# Patient Record
Sex: FEMALE | Race: WHITE | Employment: OTHER | URBAN - NONMETROPOLITAN AREA
[De-identification: names, ages, dates, MRNs, and addresses within clinical notes are randomized per-mention and may not be internally consistent; named-entity substitution may affect disease eponyms.]

---

## 2017-01-18 ENCOUNTER — CARE COORDINATION (OUTPATIENT)
Dept: PRIMARY CARE CLINIC | Age: 22
End: 2017-01-18

## 2017-04-28 ENCOUNTER — HOSPITAL ENCOUNTER (EMERGENCY)
Facility: HOSPITAL | Age: 22
Discharge: HOME | End: 2017-04-29
Attending: FAMILY MEDICINE | Admitting: EMERGENCY MEDICINE

## 2017-04-28 DIAGNOSIS — T65.91XA ACCIDENTAL INGESTION OF SUBSTANCE, ACCIDENTAL OR UNINTENTIONAL, INITIAL ENCOUNTER: Primary | ICD-10-CM

## 2017-04-28 PROCEDURE — 99283 EMERGENCY DEPT VISIT LOW MDM: CPT

## 2017-04-29 VITALS
HEART RATE: 94 BPM | RESPIRATION RATE: 20 BRPM | DIASTOLIC BLOOD PRESSURE: 75 MMHG | SYSTOLIC BLOOD PRESSURE: 129 MMHG | OXYGEN SATURATION: 100 %

## 2017-11-14 ENCOUNTER — TRANSCRIBE ORDERS (OUTPATIENT)
Dept: GENERAL RADIOLOGY | Facility: HOSPITAL | Age: 22
End: 2017-11-14

## 2017-11-14 ENCOUNTER — LAB (OUTPATIENT)
Dept: LAB | Facility: HOSPITAL | Age: 22
End: 2017-11-14

## 2017-11-14 DIAGNOSIS — Z00.01 ENCOUNTER FOR GENERAL ADULT MEDICAL EXAMINATION WITH ABNORMAL FINDINGS: ICD-10-CM

## 2017-11-14 DIAGNOSIS — Z00.01 ENCOUNTER FOR GENERAL ADULT MEDICAL EXAMINATION WITH ABNORMAL FINDINGS: Primary | ICD-10-CM

## 2017-11-14 LAB
ALBUMIN SERPL-MCNC: 4.6 G/DL (ref 3.5–5)
ALBUMIN/GLOB SERPL: 1.2 G/DL (ref 1.1–2.5)
ALP SERPL-CCNC: 56 U/L (ref 24–120)
ALT SERPL W P-5'-P-CCNC: 40 U/L (ref 0–54)
ANION GAP SERPL CALCULATED.3IONS-SCNC: 7 MMOL/L (ref 4–13)
AST SERPL-CCNC: 30 U/L (ref 7–45)
AUTO MIXED CELLS #: 0.7 10*3/MM3 (ref 0.1–2.6)
AUTO MIXED CELLS %: 9.2 % (ref 0.1–24)
BILIRUB SERPL-MCNC: 0.2 MG/DL (ref 0.1–1)
BUN BLD-MCNC: 19 MG/DL (ref 5–21)
BUN/CREAT SERPL: 26.8
CALCIUM SPEC-SCNC: 9.8 MG/DL (ref 8.4–10.4)
CHLORIDE SERPL-SCNC: 104 MMOL/L (ref 98–110)
CHOLEST SERPL-MCNC: 260 MG/DL (ref 130–200)
CO2 SERPL-SCNC: 27 MMOL/L (ref 24–31)
CREAT BLD-MCNC: 0.71 MG/DL (ref 0.5–1.4)
ERYTHROCYTE [DISTWIDTH] IN BLOOD BY AUTOMATED COUNT: 11.7 % (ref 12–15)
GFR SERPL CREATININE-BSD FRML MDRD: 104 ML/MIN/1.73
GLOBULIN UR ELPH-MCNC: 3.7 GM/DL
GLUCOSE BLD-MCNC: 80 MG/DL (ref 70–100)
HBA1C MFR BLD: 5.2 %
HCT VFR BLD AUTO: 37.5 % (ref 37–47)
HDLC SERPL-MCNC: 82 MG/DL
HGB BLD-MCNC: 12.9 G/DL (ref 12–16)
LDLC SERPL CALC-MCNC: 116 MG/DL (ref 0–99)
LDLC/HDLC SERPL: 1.42 {RATIO}
LYMPHOCYTES # BLD AUTO: 2.8 10*3/MM3 (ref 0.8–7)
LYMPHOCYTES NFR BLD AUTO: 37.5 % (ref 15–45)
MCH RBC QN AUTO: 33.1 PG (ref 28–32)
MCHC RBC AUTO-ENTMCNC: 34.4 G/DL (ref 33–36)
MCV RBC AUTO: 96.2 FL (ref 82–98)
NEUTROPHILS # BLD AUTO: 4 10*3/MM3 (ref 1.5–8.3)
NEUTROPHILS NFR BLD AUTO: 53.3 % (ref 39–78)
PLATELET # BLD AUTO: 294 10*3/MM3 (ref 130–400)
PMV BLD AUTO: 9.6 FL (ref 6–12)
POTASSIUM BLD-SCNC: 4.6 MMOL/L (ref 3.5–5.3)
PROT SERPL-MCNC: 8.3 G/DL (ref 6.3–8.7)
RBC # BLD AUTO: 3.9 10*6/MM3 (ref 4.2–5.4)
SODIUM BLD-SCNC: 138 MMOL/L (ref 135–145)
TRIGL SERPL-MCNC: 309 MG/DL (ref 0–149)
TSH SERPL DL<=0.05 MIU/L-ACNC: 2.26 MIU/ML (ref 0.47–4.68)
VLDLC SERPL-MCNC: 61.8 MG/DL
WBC NRBC COR # BLD: 7.5 10*3/MM3 (ref 4.8–10.8)

## 2017-11-14 PROCEDURE — 84443 ASSAY THYROID STIM HORMONE: CPT | Performed by: NURSE PRACTITIONER

## 2017-11-14 PROCEDURE — 80061 LIPID PANEL: CPT

## 2017-11-14 PROCEDURE — 80053 COMPREHEN METABOLIC PANEL: CPT

## 2017-11-14 PROCEDURE — 85025 COMPLETE CBC W/AUTO DIFF WBC: CPT

## 2017-11-14 PROCEDURE — 83036 HEMOGLOBIN GLYCOSYLATED A1C: CPT

## 2017-11-14 PROCEDURE — 36415 COLL VENOUS BLD VENIPUNCTURE: CPT

## 2019-01-03 ENCOUNTER — TRANSCRIBE ORDERS (OUTPATIENT)
Dept: ADMINISTRATIVE | Facility: HOSPITAL | Age: 24
End: 2019-01-03

## 2019-01-03 ENCOUNTER — LAB (OUTPATIENT)
Dept: LAB | Facility: HOSPITAL | Age: 24
End: 2019-01-03

## 2019-01-03 DIAGNOSIS — Z00.00 ROUTINE GENERAL MEDICAL EXAMINATION AT A HEALTH CARE FACILITY: Primary | ICD-10-CM

## 2019-01-03 DIAGNOSIS — Z00.00 ROUTINE GENERAL MEDICAL EXAMINATION AT A HEALTH CARE FACILITY: ICD-10-CM

## 2019-01-03 LAB
25(OH)D3 SERPL-MCNC: 23.1 NG/ML (ref 30–100)
ALBUMIN SERPL-MCNC: 4.3 G/DL (ref 3.5–5)
ALBUMIN/GLOB SERPL: 1.2 G/DL (ref 1.1–2.5)
ALP SERPL-CCNC: 48 U/L (ref 24–120)
ALT SERPL W P-5'-P-CCNC: 16 U/L (ref 0–54)
ANION GAP SERPL CALCULATED.3IONS-SCNC: 12 MMOL/L (ref 4–13)
AST SERPL-CCNC: 16 U/L (ref 7–45)
AUTO MIXED CELLS #: 1 10*3/MM3 (ref 0.1–2.6)
AUTO MIXED CELLS %: 9.9 % (ref 0.1–24)
BACTERIA UR QL AUTO: ABNORMAL /HPF
BILIRUB SERPL-MCNC: 0.4 MG/DL (ref 0.1–1)
BILIRUB UR QL STRIP: NEGATIVE
BUN BLD-MCNC: 19 MG/DL (ref 5–21)
BUN/CREAT SERPL: 24.4
CALCIUM SPEC-SCNC: 9.6 MG/DL (ref 8.4–10.4)
CHLORIDE SERPL-SCNC: 101 MMOL/L (ref 98–110)
CHOLEST SERPL-MCNC: 157 MG/DL (ref 130–200)
CLARITY UR: CLEAR
CO2 SERPL-SCNC: 29 MMOL/L (ref 24–31)
COLOR UR: YELLOW
CREAT BLD-MCNC: 0.78 MG/DL (ref 0.5–1.4)
ERYTHROCYTE [DISTWIDTH] IN BLOOD BY AUTOMATED COUNT: 11.6 % (ref 12–15)
GFR SERPL CREATININE-BSD FRML MDRD: 92 ML/MIN/1.73
GLOBULIN UR ELPH-MCNC: 3.7 GM/DL
GLUCOSE BLD-MCNC: 100 MG/DL (ref 70–100)
GLUCOSE UR STRIP-MCNC: NEGATIVE MG/DL
HBA1C MFR BLD: 5.3 %
HCT VFR BLD AUTO: 40.7 % (ref 37–47)
HDLC SERPL-MCNC: 73 MG/DL
HGB BLD-MCNC: 13.6 G/DL (ref 12–16)
HGB UR QL STRIP.AUTO: NEGATIVE
HYALINE CASTS UR QL AUTO: ABNORMAL /LPF
KETONES UR QL STRIP: NEGATIVE
LDLC SERPL CALC-MCNC: 37 MG/DL (ref 0–99)
LDLC/HDLC SERPL: 0.5 {RATIO}
LEUKOCYTE ESTERASE UR QL STRIP.AUTO: ABNORMAL
LYMPHOCYTES # BLD AUTO: 3.6 10*3/MM3 (ref 0.8–7)
LYMPHOCYTES NFR BLD AUTO: 37.2 % (ref 15–45)
MCH RBC QN AUTO: 32.6 PG (ref 28–32)
MCHC RBC AUTO-ENTMCNC: 33.4 G/DL (ref 33–36)
MCV RBC AUTO: 97.6 FL (ref 82–98)
NEUTROPHILS # BLD AUTO: 5.1 10*3/MM3 (ref 1.5–8.3)
NEUTROPHILS NFR BLD AUTO: 52.9 % (ref 39–78)
NITRITE UR QL STRIP: NEGATIVE
PH UR STRIP.AUTO: 6 [PH] (ref 5–8)
PLATELET # BLD AUTO: 316 10*3/MM3 (ref 130–400)
PMV BLD AUTO: 9.5 FL (ref 6–12)
POTASSIUM BLD-SCNC: 4.5 MMOL/L (ref 3.5–5.3)
PROT SERPL-MCNC: 8 G/DL (ref 6.3–8.7)
PROT UR QL STRIP: NEGATIVE
RBC # BLD AUTO: 4.17 10*6/MM3 (ref 4.2–5.4)
RBC # UR: ABNORMAL /HPF
REF LAB TEST METHOD: ABNORMAL
SODIUM BLD-SCNC: 142 MMOL/L (ref 135–145)
SP GR UR STRIP: >=1.03 (ref 1–1.03)
SQUAMOUS #/AREA URNS HPF: ABNORMAL /HPF
TRIGL SERPL-MCNC: 236 MG/DL (ref 0–149)
TSH SERPL DL<=0.05 MIU/L-ACNC: 2.22 MIU/ML (ref 0.47–4.68)
UROBILINOGEN UR QL STRIP: ABNORMAL
VLDLC SERPL-MCNC: 47.2 MG/DL
WBC NRBC COR # BLD: 9.7 10*3/MM3 (ref 4.8–10.8)
WBC UR QL AUTO: ABNORMAL /HPF

## 2019-01-03 PROCEDURE — 83036 HEMOGLOBIN GLYCOSYLATED A1C: CPT

## 2019-01-03 PROCEDURE — 80053 COMPREHEN METABOLIC PANEL: CPT | Performed by: NURSE PRACTITIONER

## 2019-01-03 PROCEDURE — 84443 ASSAY THYROID STIM HORMONE: CPT | Performed by: NURSE PRACTITIONER

## 2019-01-03 PROCEDURE — 85025 COMPLETE CBC W/AUTO DIFF WBC: CPT | Performed by: NURSE PRACTITIONER

## 2019-01-03 PROCEDURE — 81001 URINALYSIS AUTO W/SCOPE: CPT

## 2019-01-03 PROCEDURE — 87086 URINE CULTURE/COLONY COUNT: CPT | Performed by: NURSE PRACTITIONER

## 2019-01-03 PROCEDURE — 82306 VITAMIN D 25 HYDROXY: CPT | Performed by: NURSE PRACTITIONER

## 2019-01-03 PROCEDURE — 36415 COLL VENOUS BLD VENIPUNCTURE: CPT

## 2019-01-03 PROCEDURE — 80061 LIPID PANEL: CPT

## 2019-01-05 LAB — BACTERIA SPEC AEROBE CULT: NORMAL

## 2019-05-13 ENCOUNTER — OFFICE VISIT (OUTPATIENT)
Dept: URGENT CARE | Age: 24
End: 2019-05-13
Payer: COMMERCIAL

## 2019-05-13 VITALS
RESPIRATION RATE: 18 BRPM | HEART RATE: 115 BPM | OXYGEN SATURATION: 98 % | SYSTOLIC BLOOD PRESSURE: 112 MMHG | DIASTOLIC BLOOD PRESSURE: 80 MMHG | HEIGHT: 66 IN | WEIGHT: 193 LBS | TEMPERATURE: 98.9 F | BODY MASS INDEX: 31.02 KG/M2

## 2019-05-13 DIAGNOSIS — J02.0 STREP THROAT: ICD-10-CM

## 2019-05-13 DIAGNOSIS — J02.9 SORE THROAT: Primary | ICD-10-CM

## 2019-05-13 LAB — S PYO AG THROAT QL: POSITIVE

## 2019-05-13 PROCEDURE — 99212 OFFICE O/P EST SF 10 MIN: CPT | Performed by: NURSE PRACTITIONER

## 2019-05-13 PROCEDURE — 87880 STREP A ASSAY W/OPTIC: CPT | Performed by: NURSE PRACTITIONER

## 2019-05-13 RX ORDER — AMOXICILLIN 500 MG/1
500 CAPSULE ORAL 3 TIMES DAILY
Qty: 30 CAPSULE | Refills: 0 | Status: SHIPPED | OUTPATIENT
Start: 2019-05-13 | End: 2019-05-23

## 2019-05-13 RX ORDER — ARIPIPRAZOLE 10 MG/1
10 TABLET ORAL 2 TIMES DAILY
COMMUNITY

## 2019-05-13 RX ORDER — MIRTAZAPINE 15 MG/1
TABLET, FILM COATED ORAL
COMMUNITY

## 2019-05-13 RX ORDER — QUETIAPINE FUMARATE 400 MG/1
TABLET, FILM COATED ORAL
COMMUNITY
End: 2020-02-09

## 2019-05-13 RX ORDER — AMOXICILLIN 500 MG/1
500 CAPSULE ORAL 3 TIMES DAILY
Qty: 30 CAPSULE | Refills: 0 | Status: SHIPPED | OUTPATIENT
Start: 2019-05-13 | End: 2019-05-13 | Stop reason: SDUPTHER

## 2019-05-13 RX ORDER — ATORVASTATIN CALCIUM 10 MG/1
10 TABLET, FILM COATED ORAL DAILY
COMMUNITY

## 2019-05-13 ASSESSMENT — ENCOUNTER SYMPTOMS
COUGH: 1
SINUS PRESSURE: 0
SORE THROAT: 1
GASTROINTESTINAL NEGATIVE: 1
SHORTNESS OF BREATH: 0
EYES NEGATIVE: 1
ABDOMINAL PAIN: 0
ALLERGIC/IMMUNOLOGIC NEGATIVE: 1

## 2019-05-13 NOTE — PATIENT INSTRUCTIONS
Plenty of fluids  Rest  OTC Tylenol or Motrin as needed    Patient Education        Strep Throat: Care Instructions  Your Care Instructions    Strep throat is a bacterial infection that causes sudden, severe sore throat and fever. Strep throat, which is caused by bacteria called streptococcus, is treated with antibiotics. Sometimes a strep test is necessary to tell if the sore throat is caused by strep bacteria. Treatment can help ease symptoms and may prevent future problems. Follow-up care is a key part of your treatment and safety. Be sure to make and go to all appointments, and call your doctor if you are having problems. It's also a good idea to know your test results and keep a list of the medicines you take. How can you care for yourself at home? · Take your antibiotics as directed. Do not stop taking them just because you feel better. You need to take the full course of antibiotics. · Strep throat can spread to others until 24 hours after you begin taking antibiotics. During this time, you should avoid contact with other people at work or home, especially infants and children. Do not sneeze or cough on others, and wash your hands often. Keep your drinking glass and eating utensils separate from those of others, and wash these items well in hot, soapy water. · Gargle with warm salt water at least once each hour to help reduce swelling and make your throat feel better. Use 1 teaspoon of salt mixed in 8 fluid ounces of warm water. · Take an over-the-counter pain medication, such as acetaminophen (Tylenol), ibuprofen (Advil, Motrin), or naproxen (Aleve). Read and follow all instructions on the label. · Try an over-the-counter anesthetic throat spray or throat lozenges, which may help relieve throat pain. · Drink plenty of fluids. Fluids may help soothe an irritated throat. Hot fluids, such as tea or soup, may help your throat feel better. · Eat soft solids and drink plenty of clear liquids.  Flavored ice

## 2019-05-13 NOTE — PROGRESS NOTES
[address] [facility]  [address] [facility] [address]  Dept: [phone]  Loc: [phone]    Lisa Perez is a 22 y.o. female who presents today for her medical conditions/complaintsas noted below. Lisa Perez is c/o of Cough and Fever        HPI:     HPI  Sadi Naidu is here today with complaint of fever, sore throat, cough and congestion. Her caregiver is also sick with similar symptoms but her symptoms are resolving. She has not eaten well in the last 2 days but is drinking well. She has taken Tylenol OTC for fever today. She denies vomiting or diarrhea   Past Medical History:   Diagnosis Date    Autism      Past Surgical History:   Procedure Laterality Date    HYSTERECTOMY         Family History   Problem Relation Age of Onset    High Cholesterol Father     Heart Attack Paternal Grandfather        Social History     Tobacco Use    Smoking status: Never Smoker    Smokeless tobacco: Never Used   Substance Use Topics    Alcohol use: Not on file      Current Outpatient Medications   Medication Sig Dispense Refill    ARIPiprazole (ABILIFY) 10 MG tablet Take 10 mg by mouth      atorvastatin (LIPITOR) 10 MG tablet Take 10 mg by mouth daily      Melatonin 5 MG CAPS melatonin 5 mg capsule   Take 1 capsule every day by oral route at bedtime for 30 days.       metFORMIN (GLUCOPHAGE) 500 MG tablet metformin 500 mg tablet   1 tab BID   monitor for diarrhea      mirtazapine (REMERON) 15 MG tablet mirtazapine 15 mg tablet   TAKE 1 TABLET BY MOUTH AT BEDTIME      QUEtiapine (SEROQUEL) 400 MG tablet Take by mouth      dextromethorphan-quiNIDine (NUEDEXTA) 20-10 MG CAPS per capsule Take 1 capsule by mouth daily      TRAZODONE HCL PO Take by mouth      amoxicillin (AMOXIL) 500 MG capsule Take 1 capsule by mouth 3 times daily for 10 days 30 capsule 0    chlorproMAZINE (THORAZINE) 25 MG tablet Take 25 mg by mouth 2 times daily       Citalopram Hydrobromide (CELEXA PO) Take 20 mg by mouth daily       Divalproex Sodium (DEPAKOTE PO) Take 500 mg by mouth 4 times daily       QUEtiapine Fumarate (SEROQUEL PO) Take 400 mg by mouth daily Indications: at bedtime       CLONIDINE HCL Take 0.15 mg by mouth daily Indications: at bedtime        No current facility-administered medications for this visit. No Known Allergies    Health Maintenance   Topic Date Due    Varicella Vaccine (1 of 2 - 13+ 2-dose series) 03/22/2009    HPV vaccine (1 - Female 3-dose series) 03/22/2011    HIV screen  03/22/2011    Chlamydia screen  03/22/2012    DTaP/Tdap/Td vaccine (1 - Tdap) 03/22/2015    Cervical cancer screen  03/22/2017    Flu vaccine (Season Ended) 09/01/2019    Pneumococcal 0-64 years Vaccine  Aged Out       Subjective:     Review of Systems   Constitutional: Positive for fatigue. Negative for activity change, appetite change, chills and fever. HENT: Positive for congestion and sore throat. Negative for ear discharge, ear pain and sinus pressure. Eyes: Negative. Respiratory: Positive for cough. Negative for shortness of breath. Cardiovascular: Negative. Gastrointestinal: Negative. Negative for abdominal pain. Endocrine: Negative. Genitourinary: Negative. Musculoskeletal: Negative. Skin: Negative. Negative for rash. Allergic/Immunologic: Negative. Neurological: Negative. Negative for headaches. Hematological: Negative. Psychiatric/Behavioral: Negative.        :Objective      Physical Exam   Constitutional: She is oriented to person, place, and time. Vital signs are normal. She appears well-developed and well-nourished. She appears ill. No distress. HENT:   Head: Normocephalic.    Right Ear: Hearing, tympanic membrane, external ear and ear canal normal.   Left Ear: Hearing, tympanic membrane and external ear normal.   Nose: Nose normal. Right sinus exhibits no maxillary sinus tenderness and no frontal sinus ounces of warm water. · Take an over-the-counter pain medication, such as acetaminophen (Tylenol), ibuprofen (Advil, Motrin), or naproxen (Aleve). Read and follow all instructions on the label. · Try an over-the-counter anesthetic throat spray or throat lozenges, which may help relieve throat pain. · Drink plenty of fluids. Fluids may help soothe an irritated throat. Hot fluids, such as tea or soup, may help your throat feel better. · Eat soft solids and drink plenty of clear liquids. Flavored ice pops, ice cream, scrambled eggs, sherbet, and gelatin dessert (such as Jell-O) may also soothe the throat. · Get lots of rest.  · Do not smoke, and avoid secondhand smoke. If you need help quitting, talk to your doctor about stop-smoking programs and medicines. These can increase your chances of quitting for good. · Use a vaporizer or humidifier to add moisture to the air in your bedroom. Follow the directions for cleaning the machine. When should you call for help? Call your doctor now or seek immediate medical care if:    · You have a new or higher fever.     · You have a fever with a stiff neck or severe headache.     · You have new or worse trouble swallowing.     · Your sore throat gets much worse on one side.     · Your pain becomes much worse on one side of your throat.    Watch closely for changes in your health, and be sure to contact your doctor if:    · You are not getting better after 2 days (48 hours).     · You do not get better as expected. Where can you learn more? Go to https://[employer]. org and sign in to your [facility] account. Enter K625 in the [facility] box to learn more about \"Strep Throat: Care Instructions. \"     If you do not have an account, please click on the \"Sign Up Now\" link. Current as of: October 21, 2018  Content Version: 12.0  © 6640-5681 Healthwise, Incorporated. Care instructions adapted under license by [facility] ([place]).  If you have questions about a medical condition or this instruction, always ask your healthcare professional. [place] [address] any warranty or liability for your use of this information. Patient given educational materials- see patient instructions. Discussed use, benefit, and side effects of prescribedmedications. All patient questions answered. Pt voiced understanding.        Electronically signed by ABDIAZIZ Tariq CNP on 5/13/2019 at 4:40 PM

## 2019-08-13 ENCOUNTER — TRANSCRIBE ORDERS (OUTPATIENT)
Dept: ADMINISTRATIVE | Facility: HOSPITAL | Age: 24
End: 2019-08-13

## 2019-08-13 ENCOUNTER — LAB (OUTPATIENT)
Dept: LAB | Facility: HOSPITAL | Age: 24
End: 2019-08-13

## 2019-08-13 DIAGNOSIS — Z51.81 ENCOUNTER FOR THERAPEUTIC DRUG MONITORING: ICD-10-CM

## 2019-08-13 DIAGNOSIS — Z51.81 ENCOUNTER FOR THERAPEUTIC DRUG MONITORING: Primary | ICD-10-CM

## 2019-08-13 LAB
ALBUMIN SERPL-MCNC: 4.2 G/DL (ref 3.5–5)
ALBUMIN/GLOB SERPL: 1.1 G/DL (ref 1.1–2.5)
ALP SERPL-CCNC: 47 U/L (ref 24–120)
ALT SERPL W P-5'-P-CCNC: <15 U/L (ref 0–54)
ANION GAP SERPL CALCULATED.3IONS-SCNC: 8 MMOL/L (ref 4–13)
AST SERPL-CCNC: 16 U/L (ref 7–45)
AUTO MIXED CELLS #: 0.8 10*3/MM3 (ref 0.1–2.6)
AUTO MIXED CELLS %: 10.1 % (ref 0.1–24)
BILIRUB SERPL-MCNC: 0.2 MG/DL (ref 0.1–1)
BUN BLD-MCNC: 16 MG/DL (ref 5–21)
BUN/CREAT SERPL: 21.1
CALCIUM SPEC-SCNC: 9.7 MG/DL (ref 8.4–10.4)
CHLORIDE SERPL-SCNC: 104 MMOL/L (ref 98–110)
CHOLEST SERPL-MCNC: 122 MG/DL (ref 130–200)
CO2 SERPL-SCNC: 28 MMOL/L (ref 24–31)
CREAT BLD-MCNC: 0.76 MG/DL (ref 0.5–1.4)
ERYTHROCYTE [DISTWIDTH] IN BLOOD BY AUTOMATED COUNT: 12.4 % (ref 12.3–15.4)
GFR SERPL CREATININE-BSD FRML MDRD: 94 ML/MIN/1.73
GLOBULIN UR ELPH-MCNC: 3.8 GM/DL
GLUCOSE BLD-MCNC: 83 MG/DL (ref 70–100)
HBA1C MFR BLD: 5.2 % (ref 4.8–5.9)
HCT VFR BLD AUTO: 36.8 % (ref 34–46.6)
HDLC SERPL-MCNC: 68 MG/DL
HGB BLD-MCNC: 12.4 G/DL (ref 12–15.9)
LDLC SERPL CALC-MCNC: 28 MG/DL (ref 0–99)
LDLC/HDLC SERPL: 0.41 {RATIO}
LYMPHOCYTES # BLD AUTO: 3 10*3/MM3 (ref 0.7–3.1)
LYMPHOCYTES NFR BLD AUTO: 38.5 % (ref 19.6–45.3)
MCH RBC QN AUTO: 31.6 PG (ref 26.6–33)
MCHC RBC AUTO-ENTMCNC: 33.7 G/DL (ref 31.5–35.7)
MCV RBC AUTO: 93.9 FL (ref 79–97)
NEUTROPHILS # BLD AUTO: 3.9 10*3/MM3 (ref 1.7–7)
NEUTROPHILS NFR BLD AUTO: 51.4 % (ref 42.7–76)
PLATELET # BLD AUTO: 273 10*3/MM3 (ref 140–450)
PMV BLD AUTO: 9.4 FL (ref 6–12)
POTASSIUM BLD-SCNC: 4.6 MMOL/L (ref 3.5–5.3)
PROT SERPL-MCNC: 8 G/DL (ref 6.3–8.7)
RBC # BLD AUTO: 3.92 10*6/MM3 (ref 3.77–5.28)
SODIUM BLD-SCNC: 140 MMOL/L (ref 135–145)
TRIGL SERPL-MCNC: 129 MG/DL (ref 0–149)
VLDLC SERPL-MCNC: 25.8 MG/DL
WBC NRBC COR # BLD: 7.7 10*3/MM3 (ref 3.4–10.8)

## 2019-08-13 PROCEDURE — 85025 COMPLETE CBC W/AUTO DIFF WBC: CPT | Performed by: PEDIATRICS

## 2019-08-13 PROCEDURE — 83036 HEMOGLOBIN GLYCOSYLATED A1C: CPT

## 2019-08-13 PROCEDURE — 36415 COLL VENOUS BLD VENIPUNCTURE: CPT | Performed by: PEDIATRICS

## 2019-08-13 PROCEDURE — 80061 LIPID PANEL: CPT

## 2019-08-13 PROCEDURE — 80053 COMPREHEN METABOLIC PANEL: CPT | Performed by: PEDIATRICS

## 2020-02-09 ENCOUNTER — HOSPITAL ENCOUNTER (EMERGENCY)
Age: 25
Discharge: HOME | End: 2020-02-09
Attending: EMERGENCY MEDICINE
Payer: COMMERCIAL

## 2020-02-09 ENCOUNTER — APPOINTMENT (OUTPATIENT)
Dept: GENERAL RADIOLOGY | Age: 25
End: 2020-02-09
Payer: COMMERCIAL

## 2020-02-09 ENCOUNTER — OFFICE VISIT (OUTPATIENT)
Dept: URGENT CARE | Age: 25
End: 2020-02-09
Payer: COMMERCIAL

## 2020-02-09 VITALS
DIASTOLIC BLOOD PRESSURE: 72 MMHG | TEMPERATURE: 97.3 F | HEART RATE: 74 BPM | SYSTOLIC BLOOD PRESSURE: 108 MMHG | BODY MASS INDEX: 28.28 KG/M2 | OXYGEN SATURATION: 95 % | RESPIRATION RATE: 14 BRPM | WEIGHT: 176 LBS | HEIGHT: 66 IN

## 2020-02-09 VITALS
WEIGHT: 176 LBS | DIASTOLIC BLOOD PRESSURE: 62 MMHG | HEART RATE: 86 BPM | OXYGEN SATURATION: 99 % | TEMPERATURE: 98 F | HEIGHT: 66 IN | BODY MASS INDEX: 28.28 KG/M2 | SYSTOLIC BLOOD PRESSURE: 100 MMHG | RESPIRATION RATE: 20 BRPM

## 2020-02-09 LAB
ALBUMIN SERPL-MCNC: 4.5 G/DL (ref 3.5–5.2)
ALP BLD-CCNC: 43 U/L (ref 35–104)
ALT SERPL-CCNC: 11 U/L (ref 5–33)
ANION GAP SERPL CALCULATED.3IONS-SCNC: 14 MMOL/L (ref 7–19)
APPEARANCE FLUID: ABNORMAL
AST SERPL-CCNC: 22 U/L (ref 5–32)
BASOPHILS ABSOLUTE: 0 K/UL (ref 0–0.2)
BASOPHILS RELATIVE PERCENT: 0.4 % (ref 0–1)
BILIRUB SERPL-MCNC: <0.2 MG/DL (ref 0.2–1.2)
BILIRUBIN URINE: NEGATIVE
BILIRUBIN, POC: NEGATIVE
BLOOD URINE, POC: NEGATIVE
BLOOD, URINE: NEGATIVE
BUN BLDV-MCNC: 19 MG/DL (ref 6–20)
CALCIUM SERPL-MCNC: 9.7 MG/DL (ref 8.6–10)
CHLORIDE BLD-SCNC: 100 MMOL/L (ref 98–111)
CLARITY, POC: CLEAR
CLARITY: CLEAR
CO2: 23 MMOL/L (ref 22–29)
COLOR, POC: ABNORMAL
COLOR: YELLOW
CREAT SERPL-MCNC: 0.7 MG/DL (ref 0.5–0.9)
EOSINOPHILS ABSOLUTE: 0.2 K/UL (ref 0–0.6)
EOSINOPHILS RELATIVE PERCENT: 1.9 % (ref 0–5)
GFR NON-AFRICAN AMERICAN: >60
GLUCOSE BLD-MCNC: 77 MG/DL (ref 74–109)
GLUCOSE URINE, POC: NEGATIVE
GLUCOSE URINE: NEGATIVE MG/DL
HCG QUALITATIVE: NEGATIVE
HCT VFR BLD CALC: 39.5 % (ref 37–47)
HEMOGLOBIN: 12.9 G/DL (ref 12–16)
IMMATURE GRANULOCYTES #: 0 K/UL
INFLUENZA A ANTIBODY: NEGATIVE
INFLUENZA B ANTIBODY: NEGATIVE
KETONES, POC: ABNORMAL
KETONES, URINE: 15 MG/DL
LEUKOCYTE EST, POC: ABNORMAL
LEUKOCYTE ESTERASE, URINE: NEGATIVE
LYMPHOCYTES ABSOLUTE: 3.8 K/UL (ref 1.1–4.5)
LYMPHOCYTES RELATIVE PERCENT: 34.9 % (ref 20–40)
MCH RBC QN AUTO: 32.4 PG (ref 27–31)
MCHC RBC AUTO-ENTMCNC: 32.7 G/DL (ref 33–37)
MCV RBC AUTO: 99.2 FL (ref 81–99)
MONOCYTES ABSOLUTE: 0.8 K/UL (ref 0–0.9)
MONOCYTES RELATIVE PERCENT: 7.4 % (ref 0–10)
NEUTROPHILS ABSOLUTE: 6 K/UL (ref 1.5–7.5)
NEUTROPHILS RELATIVE PERCENT: 55.1 % (ref 50–65)
NITRITE, POC: NEGATIVE
NITRITE, URINE: NEGATIVE
PDW BLD-RTO: 12.3 % (ref 11.5–14.5)
PH UA: 6 (ref 5–8)
PH, POC: 6.5
PLATELET # BLD: 295 K/UL (ref 130–400)
PMV BLD AUTO: 10.9 FL (ref 9.4–12.3)
POTASSIUM REFLEX MAGNESIUM: 5.5 MMOL/L (ref 3.5–5)
PROTEIN UA: NEGATIVE MG/DL
PROTEIN, POC: NEGATIVE
RBC # BLD: 3.98 M/UL (ref 4.2–5.4)
SODIUM BLD-SCNC: 137 MMOL/L (ref 136–145)
SPECIFIC GRAVITY UA: 1.03 (ref 1–1.03)
SPECIFIC GRAVITY, POC: 1.02
TOTAL PROTEIN: 7.6 G/DL (ref 6.6–8.7)
URINE REFLEX TO CULTURE: ABNORMAL
UROBILINOGEN, POC: 0.2
UROBILINOGEN, URINE: 0.2 E.U./DL
VALPROIC ACID LEVEL: 99.8 UG/ML (ref 50–100)
WBC # BLD: 10.9 K/UL (ref 4.8–10.8)

## 2020-02-09 PROCEDURE — 2580000003 HC RX 258: Performed by: EMERGENCY MEDICINE

## 2020-02-09 PROCEDURE — 85025 COMPLETE CBC W/AUTO DIFF WBC: CPT

## 2020-02-09 PROCEDURE — 36415 COLL VENOUS BLD VENIPUNCTURE: CPT

## 2020-02-09 PROCEDURE — 87804 INFLUENZA ASSAY W/OPTIC: CPT | Performed by: NURSE PRACTITIONER

## 2020-02-09 PROCEDURE — 99284 EMERGENCY DEPT VISIT MOD MDM: CPT

## 2020-02-09 PROCEDURE — 81003 URINALYSIS AUTO W/O SCOPE: CPT

## 2020-02-09 PROCEDURE — 71046 X-RAY EXAM CHEST 2 VIEWS: CPT

## 2020-02-09 PROCEDURE — 81002 URINALYSIS NONAUTO W/O SCOPE: CPT | Performed by: NURSE PRACTITIONER

## 2020-02-09 PROCEDURE — 80053 COMPREHEN METABOLIC PANEL: CPT

## 2020-02-09 PROCEDURE — 84703 CHORIONIC GONADOTROPIN ASSAY: CPT

## 2020-02-09 PROCEDURE — 80164 ASSAY DIPROPYLACETIC ACD TOT: CPT

## 2020-02-09 RX ORDER — 0.9 % SODIUM CHLORIDE 0.9 %
1000 INTRAVENOUS SOLUTION INTRAVENOUS ONCE
Status: COMPLETED | OUTPATIENT
Start: 2020-02-09 | End: 2020-02-09

## 2020-02-09 RX ORDER — ALPRAZOLAM 0.5 MG/1
TABLET ORAL PRN
COMMUNITY

## 2020-02-09 RX ORDER — 0.9 % SODIUM CHLORIDE 0.9 %
1000 INTRAVENOUS SOLUTION INTRAVENOUS ONCE
Status: DISCONTINUED | OUTPATIENT
Start: 2020-02-09 | End: 2020-02-09

## 2020-02-09 RX ORDER — QUETIAPINE FUMARATE 200 MG/1
TABLET, FILM COATED ORAL
COMMUNITY

## 2020-02-09 RX ADMIN — SODIUM CHLORIDE 1000 ML: 9 INJECTION, SOLUTION INTRAVENOUS at 19:00

## 2020-02-09 SDOH — HEALTH STABILITY: MENTAL HEALTH: HOW OFTEN DO YOU HAVE A DRINK CONTAINING ALCOHOL?: NEVER

## 2020-02-09 ASSESSMENT — ENCOUNTER SYMPTOMS
VOMITING: 0
SHORTNESS OF BREATH: 0
COUGH: 0

## 2020-02-09 NOTE — ED PROVIDER NOTES
[facility]  eMERGENCY dEPARTMENT eNCOUnter      Pt Name: Alta Galvan  MRN: [id number]  [place] 1995  Date of evaluation: 2/9/2020  Provider: Lo Dye MD    [address] [place] [address]       Chief Complaint   Patient presents with    Fatigue     came from urgent care, negative for flu and UTI, fatigued x3 days         HISTORY OF PRESENT ILLNESS   (Location/Symptom, Timing/Onset,Context/Setting, Quality, Duration, Modifying Factors, Severity)  Note limiting factors. Alta Galvan is a 22 y.o. female who presents to the emergency department      The history is provided by the patient. History limited by: pt autistic. Fatigue   Severity:  Moderate  Onset quality:  Gradual  Duration:  2 days  Timing:  Constant  Progression:  Worsening  Chronicity:  New  Context comment:  Flu and U/A neg at UC  Relieved by:  None tried  Worsened by:  Nothing  Ineffective treatments:  None tried  Associated symptoms: no cough, no fever, no shortness of breath and no vomiting    Associated symptoms comment:  No apparent pain. NursingNotes were reviewed. REVIEW OF SYSTEMS    (2-9 systems for level 4, 10 or more for level 5)     Review of Systems   Constitutional: Positive for fatigue. Negative for fever. Respiratory: Negative for cough and shortness of breath. Gastrointestinal: Negative for vomiting. All other systems reviewed and are negative. Except as noted above the remainder of the review of systems was reviewed and negative.        PAST MEDICAL HISTORY     Past Medical History:   Diagnosis Date    Autism          SURGICALHISTORY       Past Surgical History:   Procedure Laterality Date    HYSTERECTOMY           CURRENT MEDICATIONS       Discharge Medication List as of 2/9/2020  7:07 PM      CONTINUE these medications which have NOT CHANGED    Details   QUEtiapine (SEROQUEL) 200 MG tablet quetiapine 200 mg tablet   TAKE 1 TABLET BY MOUTH AT BEDTIMEHistorical Med      ALPRAZolam Jettie Saran) 0.5 MG tablet as needed. Historical Med      Psyllium (METAMUCIL PO) Take by mouthHistorical Med      ARIPiprazole (ABILIFY) 10 MG tablet Take 10 mg by mouth 2 times daily Historical Med      atorvastatin (LIPITOR) 10 MG tablet Take 10 mg by mouth dailyHistorical Med      Melatonin 5 MG CAPS melatonin 5 mg capsule   Take 1 capsule every day by oral route at bedtime for 30 days. Historical Med      metFORMIN (GLUCOPHAGE) 500 MG tablet metformin 500 mg tablet   1 tab BID   monitor for diarrheaHistorical Med      mirtazapine (REMERON) 15 MG tablet mirtazapine 15 mg tablet   TAKE 1 TABLET BY MOUTH AT BEDTIMEHistorical Med      dextromethorphan-quiNIDine (NUEDEXTA) 20-10 MG CAPS per capsule Take 1 capsule by mouth dailyHistorical Med      TRAZODONE HCL PO Take 150 mg by mouth nightly 1/2 Tablet at bedtimeHistorical Med      chlorproMAZINE (THORAZINE) 25 MG tablet Take 25 mg by mouth 2 times daily       Citalopram Hydrobromide (CELEXA PO) Take 20 mg by mouth daily       Divalproex Sodium (DEPAKOTE PO) Take 500 mg by mouth 4 times daily       CLONIDINE HCL Take 0.15 mg by mouth daily Indications: at bedtime 1 1/2 tablets at bedtimeHistorical Med             ALLERGIES     Patient has no known allergies.     FAMILY HISTORY       Family History   Problem Relation Age of Onset    High Cholesterol Father     Heart Attack Paternal Grandfather           SOCIAL HISTORY       Social History     Socioeconomic History    Marital status: Single     Spouse name: Not on file    Number of children: Not on file    Years of education: Not on file    Highest education level: Not on file   Occupational History    Not on file   Social Needs    Financial resource strain: Not on file    Food insecurity:     Worry: Not on file     Inability: Not on file    Transportation needs:     Medical: Not on file     Non-medical: Not on file   Tobacco Use    Smoking status: Never Smoker    Smokeless tobacco: Never Used   Substance and Sexual Activity    Alcohol use: Never     Alcohol/week: 0.0 standard drinks     Frequency: Never    Drug use: Never    Sexual activity: Not on file   Lifestyle    Physical activity:     Days per week: Not on file     Minutes per session: Not on file    Stress: Not on file   Relationships    Social connections:     Talks on phone: Not on file     Gets together: Not on file     Attends Scientology service: Not on file     Active member of club or organization: Not on file     Attends meetings of clubs or organizations: Not on file     Relationship status: Not on file    Intimate partner violence:     Fear of current or ex partner: Not on file     Emotionally abused: Not on file     Physically abused: Not on file     Forced sexual activity: Not on file   Other Topics Concern    Not on file   Social History Narrative    Not on file       SCREENINGS    [place] Coma Scale  Eye Opening: Spontaneous  Best Verbal Response: Oriented  Best Motor Response: Obeys commands  [place] Coma Scale Score: 15 @FLOW([id number])@      PHYSICAL EXAM    (up to 7 for level 4, 8 or more for level 5)     ED Triage Vitals   BP Temp Temp src Pulse Resp SpO2 Height Weight   02/09/20 1549 02/09/20 1546 -- 02/09/20 1546 02/09/20 1546 02/09/20 1546 02/09/20 1546 02/09/20 1546   106/70 97.3 °F (36.3 °C)  126 16 95 % 5' 6\" (1.676 m) 176 lb (79.8 kg)       Physical Exam  Vitals signs and nursing note reviewed. Constitutional:       General: She is not in acute distress. Appearance: Normal appearance. She is normal weight. She is not ill-appearing, toxic-appearing or diaphoretic. HENT:      Head: Normocephalic and atraumatic. Nose: Nose normal.      Mouth/Throat:      Mouth: Mucous membranes are moist.      Pharynx: Oropharynx is clear. Eyes:      Conjunctiva/sclera: Conjunctivae normal.   Neck:      Musculoskeletal: Normal range of motion and neck supple. Cardiovascular:      Rate and Rhythm: Normal rate and regular rhythm. Heart sounds: Normal heart sounds. Pulmonary:      Effort: Pulmonary effort is normal.      Breath sounds: Normal breath sounds. Abdominal:      Tenderness: There is no abdominal tenderness. Musculoskeletal:      Right lower leg: No edema. Left lower leg: No edema. Skin:     General: Skin is warm and dry. Capillary Refill: Capillary refill takes less than 2 seconds. Comments: Pale per family   Neurological:      General: No focal deficit present. Mental Status: She is alert. Comments: Baseline autism   Psychiatric:      Comments: smiles         DIAGNOSTIC RESULTS     EKG: All EKG's are interpreted by the Emergency Department Physician who either signs or Co-signsthis chart in the absence of a cardiologist.    EKG:  Regular rate and rhythm. Normal P waves and CA interval. Normal QRS. Normal QT interval. No ST elevation or depression. This EKG was interpreted by me. RADIOLOGY:   Non-plain filmimages such as CT, Ultrasound and MRI are read by the radiologist. Plain radiographic images are visualized and preliminarily interpreted by the emergency physician with the below findings:        Interpretation per the Radiologist below, if available at the time ofthis note:    XR CHEST STANDARD (2 VW)   Final Result   Impression:   No acute cardiopulmonary disease.    Signed by Dr Fide Greene on 2/9/2020 4:41 PM            ED BEDSIDE ULTRASOUND:   Performed by ED Physician - none    LABS:  Labs Reviewed   CBC WITH AUTO DIFFERENTIAL - Abnormal; Notable for the following components:       Result Value    WBC 10.9 (*)     RBC 3.98 (*)     MCV 99.2 (*)     MCH 32.4 (*)     MCHC 32.7 (*)     All other components within normal limits   COMPREHENSIVE METABOLIC PANEL W/ REFLEX TO MG FOR LOW K - Abnormal; Notable for the following components:    Potassium reflex Magnesium 5.5 (*)     All other components within normal limits   URINE RT REFLEX TO CULTURE - Abnormal; Notable for the following

## 2020-07-21 ENCOUNTER — TRANSCRIBE ORDERS (OUTPATIENT)
Dept: ADMINISTRATIVE | Facility: HOSPITAL | Age: 25
End: 2020-07-21

## 2020-07-21 ENCOUNTER — LAB (OUTPATIENT)
Dept: LAB | Facility: HOSPITAL | Age: 25
End: 2020-07-21

## 2020-07-21 DIAGNOSIS — R30.9 PAINFUL MICTURITION, UNSPECIFIED: Primary | ICD-10-CM

## 2020-07-21 LAB
BACTERIA UR QL AUTO: ABNORMAL /HPF
BILIRUB UR QL STRIP: NEGATIVE
CLARITY UR: CLEAR
COLOR UR: YELLOW
GLUCOSE UR STRIP-MCNC: NEGATIVE MG/DL
HGB UR QL STRIP.AUTO: ABNORMAL
HYALINE CASTS UR QL AUTO: ABNORMAL /LPF
KETONES UR QL STRIP: ABNORMAL
LEUKOCYTE ESTERASE UR QL STRIP.AUTO: NEGATIVE
MUCOUS THREADS URNS QL MICRO: ABNORMAL /HPF
NITRITE UR QL STRIP: NEGATIVE
PH UR STRIP.AUTO: 6 [PH] (ref 5–8)
PROT UR QL STRIP: NEGATIVE
RBC # UR: ABNORMAL /HPF
REF LAB TEST METHOD: ABNORMAL
SP GR UR STRIP: 1.02 (ref 1–1.03)
SQUAMOUS #/AREA URNS HPF: ABNORMAL /HPF
UROBILINOGEN UR QL STRIP: ABNORMAL
WBC UR QL AUTO: ABNORMAL /HPF

## 2020-07-21 PROCEDURE — 81001 URINALYSIS AUTO W/SCOPE: CPT | Performed by: PEDIATRICS

## 2020-07-21 PROCEDURE — 87086 URINE CULTURE/COLONY COUNT: CPT | Performed by: PEDIATRICS

## 2020-07-23 LAB — BACTERIA SPEC AEROBE CULT: NORMAL

## 2020-07-29 ENCOUNTER — HOSPITAL ENCOUNTER (EMERGENCY)
Age: 25
Discharge: HOME | End: 2020-07-29
Payer: COMMERCIAL

## 2020-07-29 ENCOUNTER — APPOINTMENT (OUTPATIENT)
Dept: GENERAL RADIOLOGY | Age: 25
End: 2020-07-29
Payer: COMMERCIAL

## 2020-07-29 VITALS
DIASTOLIC BLOOD PRESSURE: 81 MMHG | RESPIRATION RATE: 16 BRPM | WEIGHT: 176 LBS | OXYGEN SATURATION: 97 % | SYSTOLIC BLOOD PRESSURE: 125 MMHG | TEMPERATURE: 98.1 F | HEART RATE: 120 BPM | BODY MASS INDEX: 28.41 KG/M2

## 2020-07-29 LAB
ALBUMIN SERPL-MCNC: 4.1 G/DL (ref 3.5–5.2)
ALP BLD-CCNC: 48 U/L (ref 35–104)
ALT SERPL-CCNC: 21 U/L (ref 5–33)
ANION GAP SERPL CALCULATED.3IONS-SCNC: 13 MMOL/L (ref 7–19)
AST SERPL-CCNC: 24 U/L (ref 5–32)
BASOPHILS ABSOLUTE: 0 K/UL (ref 0–0.2)
BASOPHILS RELATIVE PERCENT: 0.6 % (ref 0–1)
BILIRUB SERPL-MCNC: <0.2 MG/DL (ref 0.2–1.2)
BILIRUBIN URINE: NEGATIVE
BLOOD, URINE: NEGATIVE
BUN BLDV-MCNC: 11 MG/DL (ref 6–20)
CALCIUM SERPL-MCNC: 9.3 MG/DL (ref 8.6–10)
CHLORIDE BLD-SCNC: 100 MMOL/L (ref 98–111)
CLARITY: CLEAR
CO2: 21 MMOL/L (ref 22–29)
COLOR: YELLOW
CREAT SERPL-MCNC: 0.8 MG/DL (ref 0.5–0.9)
EOSINOPHILS ABSOLUTE: 0.2 K/UL (ref 0–0.6)
EOSINOPHILS RELATIVE PERCENT: 3.5 % (ref 0–5)
GFR AFRICAN AMERICAN: >59
GFR NON-AFRICAN AMERICAN: >60
GLUCOSE BLD-MCNC: 91 MG/DL (ref 74–109)
GLUCOSE URINE: NEGATIVE MG/DL
HCT VFR BLD CALC: 39.2 % (ref 37–47)
HEMOGLOBIN: 13.3 G/DL (ref 12–16)
IMMATURE GRANULOCYTES #: 0.1 K/UL
KETONES, URINE: NEGATIVE MG/DL
LEUKOCYTE ESTERASE, URINE: NEGATIVE
LYMPHOCYTES ABSOLUTE: 0.8 K/UL (ref 1.1–4.5)
LYMPHOCYTES RELATIVE PERCENT: 11 % (ref 20–40)
MCH RBC QN AUTO: 32.9 PG (ref 27–31)
MCHC RBC AUTO-ENTMCNC: 33.9 G/DL (ref 33–37)
MCV RBC AUTO: 97 FL (ref 81–99)
MONOCYTES ABSOLUTE: 0.8 K/UL (ref 0–0.9)
MONOCYTES RELATIVE PERCENT: 11.3 % (ref 0–10)
NEUTROPHILS ABSOLUTE: 4.9 K/UL (ref 1.5–7.5)
NEUTROPHILS RELATIVE PERCENT: 72.7 % (ref 50–65)
NITRITE, URINE: NEGATIVE
PDW BLD-RTO: 11.9 % (ref 11.5–14.5)
PH UA: 7 (ref 5–8)
PLATELET # BLD: 249 K/UL (ref 130–400)
PMV BLD AUTO: 10.3 FL (ref 9.4–12.3)
POTASSIUM REFLEX MAGNESIUM: 5.1 MMOL/L (ref 3.5–5)
PROTEIN UA: NEGATIVE MG/DL
RBC # BLD: 4.04 M/UL (ref 4.2–5.4)
SODIUM BLD-SCNC: 134 MMOL/L (ref 136–145)
SPECIFIC GRAVITY UA: 1.02 (ref 1–1.03)
TOTAL PROTEIN: 7.4 G/DL (ref 6.6–8.7)
UROBILINOGEN, URINE: 0.2 E.U./DL
VALPROIC ACID LEVEL: 134.5 UG/ML (ref 50–100)
WBC # BLD: 6.8 K/UL (ref 4.8–10.8)

## 2020-07-29 PROCEDURE — 2580000003 HC RX 258: Performed by: NURSE PRACTITIONER

## 2020-07-29 PROCEDURE — 71045 X-RAY EXAM CHEST 1 VIEW: CPT

## 2020-07-29 PROCEDURE — 36415 COLL VENOUS BLD VENIPUNCTURE: CPT

## 2020-07-29 PROCEDURE — 80053 COMPREHEN METABOLIC PANEL: CPT

## 2020-07-29 PROCEDURE — 85025 COMPLETE CBC W/AUTO DIFF WBC: CPT

## 2020-07-29 PROCEDURE — 80164 ASSAY DIPROPYLACETIC ACD TOT: CPT

## 2020-07-29 PROCEDURE — 81003 URINALYSIS AUTO W/O SCOPE: CPT

## 2020-07-29 PROCEDURE — 99283 EMERGENCY DEPT VISIT LOW MDM: CPT

## 2020-07-29 RX ORDER — 0.9 % SODIUM CHLORIDE 0.9 %
1000 INTRAVENOUS SOLUTION INTRAVENOUS ONCE
Status: COMPLETED | OUTPATIENT
Start: 2020-07-29 | End: 2020-07-29

## 2020-07-29 RX ADMIN — SODIUM CHLORIDE 1000 ML: 9 INJECTION, SOLUTION INTRAVENOUS at 20:13

## 2020-07-29 ASSESSMENT — ENCOUNTER SYMPTOMS
DIARRHEA: 1
VOMITING: 1
COUGH: 1
ABDOMINAL PAIN: 1

## 2020-07-30 NOTE — ED PROVIDER NOTES
needed. ARIPIPRAZOLE (ABILIFY) 10 MG TABLET    Take 10 mg by mouth 2 times daily     ATORVASTATIN (LIPITOR) 10 MG TABLET    Take 10 mg by mouth daily    CHLORPROMAZINE (THORAZINE) 25 MG TABLET    Take 25 mg by mouth 2 times daily     CITALOPRAM HYDROBROMIDE (CELEXA PO)    Take 20 mg by mouth daily     CLONIDINE HCL    Take 0.15 mg by mouth daily Indications: at bedtime 1 1/2 tablets at bedtime    DEXTROMETHORPHAN-QUINIDINE (NUEDEXTA) 20-10 MG CAPS PER CAPSULE    Take 1 capsule by mouth daily    DIVALPROEX SODIUM (DEPAKOTE PO)    Take 500 mg by mouth 4 times daily     MELATONIN 5 MG CAPS    melatonin 5 mg capsule   Take 1 capsule every day by oral route at bedtime for 30 days. METFORMIN (GLUCOPHAGE) 500 MG TABLET    metformin 500 mg tablet   1 tab BID   monitor for diarrhea    MIRTAZAPINE (REMERON) 15 MG TABLET    mirtazapine 15 mg tablet   TAKE 1 TABLET BY MOUTH AT BEDTIME    PSYLLIUM (METAMUCIL PO)    Take by mouth    QUETIAPINE (SEROQUEL) 200 MG TABLET    quetiapine 200 mg tablet   TAKE 1 TABLET BY MOUTH AT BEDTIME    TRAZODONE HCL PO    Take 150 mg by mouth nightly 1/2 Tablet at bedtime       ALLERGIES     Patient has no known allergies.     FAMILY HISTORY       Family History   Problem Relation Age of Onset    High Cholesterol Father     Heart Attack Paternal Grandfather           SOCIAL HISTORY       Social History     Socioeconomic History    Marital status: Single     Spouse name: None    Number of children: None    Years of education: None    Highest education level: None   Occupational History    None   Social Needs    Financial resource strain: None    Food insecurity     Worry: None     Inability: None    Transportation needs     Medical: None     Non-medical: None   Tobacco Use    Smoking status: Never Smoker    Smokeless tobacco: Never Used   Substance and Sexual Activity    Alcohol use: Never     Alcohol/week: 0.0 standard drinks     Frequency: Never    Drug use: Never    Sexual activity: None   Lifestyle    Physical activity     Days per week: None     Minutes per session: None    Stress: None   Relationships    Social connections     Talks on phone: None     Gets together: None     Attends Mormonism service: None     Active member of club or organization: None     Attends meetings of clubs or organizations: None     Relationship status: None    Intimate partner violence     Fear of current or ex partner: None     Emotionally abused: None     Physically abused: None     Forced sexual activity: None   Other Topics Concern    None   Social History Narrative    None       SCREENINGS             PHYSICAL EXAM    (up to 7 for level 4, 8 or more for level 5)     ED Triage Vitals [07/29/20 1855]   BP Temp Temp src Pulse Resp SpO2 Height Weight   125/81 99.1 °F (37.3 °C) -- 150 17 95 % -- 176 lb (79.8 kg)       Physical Exam  Vitals signs reviewed. Constitutional:       Comments: Pt is awake, alert, nontoxic, well hydrated appearing. She is rocking back and forth in exam bed   HENT:      Head: Normocephalic. Right Ear: External ear normal.      Left Ear: External ear normal.   Eyes:      Conjunctiva/sclera: Conjunctivae normal.      Pupils: Pupils are equal, round, and reactive to light. Neck:      Musculoskeletal: Normal range of motion. Cardiovascular:      Rate and Rhythm: Normal rate and regular rhythm. Heart sounds: Normal heart sounds. Pulmonary:      Effort: Pulmonary effort is normal.      Breath sounds: Normal breath sounds. Abdominal:      General: Bowel sounds are normal.      Palpations: Abdomen is soft. Musculoskeletal: Normal range of motion. Skin:     General: Skin is warm and dry. Neurological:      Mental Status: She is alert and oriented to person, place, and time.          DIAGNOSTIC RESULTS     EKG: All EKG's are interpreted by the Emergency Department Physician who either signs or Co-signs this chart in the absence of acardiologist.        RADIOLOGY:   Non-plain film images such as CT, Ultrasound andMRI are read by the radiologist. Plain radiographic images are visualized and preliminarily interpreted by the emergency physician with the below findings:        Interpretation per the Radiologist below, if available at the time of this note:    XR CHEST PORTABLE   Final Result   1. No radiographic evidence of acute cardiopulmonary process. Signed by Dr Carson Garcia on 7/29/2020 8:14 PM            ED BEDSIDE ULTRASOUND:   Performed by ED Physician - none    LABS:  Labs Reviewed   CBC WITH AUTO DIFFERENTIAL - Abnormal; Notable for the following components:       Result Value    RBC 4.04 (*)     MCH 32.9 (*)     Neutrophils % 72.7 (*)     Lymphocytes % 11.0 (*)     Monocytes % 11.3 (*)     Lymphocytes Absolute 0.8 (*)     All other components within normal limits   COMPREHENSIVE METABOLIC PANEL W/ REFLEX TO MG FOR LOW K - Abnormal; Notable for the following components:    Sodium 134 (*)     Potassium reflex Magnesium 5.1 (*)     CO2 21 (*)     All other components within normal limits   VALPROIC ACID LEVEL, TOTAL - Abnormal; Notable for the following components:    Valproic Acid Lvl 134.5 (*)     All other components within normal limits    Narrative:     CALL  [facility] tel. ,  Chemistry results called to and read back by Rich Espinoza RN in ED, 07/29/2020  20:43, by [address] RT REFLEX TO CULTURE   COVID-19       All other labs were within normal range or not returned as of this dictation. RE-ASSESSMENT     Considered obtaining ekg d/t elevated depakote level however patient continues to rock back and forth in bed. I think risk of sedating her for ekg outweighs possible benefit of noting a prolonged QT interval on test. Discussed with caregiver regarding elevated depakote level. She tells me that she has been on same dose for over a year. She will call tomorrow to see if they recommend changing dose or continuation same. EMERGENCY DEPARTMENT COURSE and DIFFERENTIALDIAGNOSIS/MDM:   Vitals:    Vitals:    07/29/20 1855 07/29/20 2100   BP: 125/81    Pulse: 150 120   Resp: 17 16   Temp: 99.1 °F (37.3 °C) 98.1 °F (36.7 °C)   TempSrc:  Oral   SpO2: 95% 97%   Weight: 176 lb (79.8 kg)        MDM      CONSULTS:  None    PROCEDURES:  Unless otherwise notedbelow, none     Procedures    FINAL IMPRESSION     1.  Fever, unspecified fever cause          DISPOSITION/PLAN   DISPOSITION        PATIENT REFERRED TO:  Aneudy Obando MD  [facility] [address]  [phone]    Schedule an appointment as soon as possible for a visit   recheck depakote level      DISCHARGE MEDICATIONS:       Current Discharge Medication List          (Pleasenote that portions of this note were completed with a voice recognition program.  Efforts were made to edit the dictations but occasionally words are mis-transcribed.)             Lucila Triana, APRN  07/29/20 6400

## 2020-08-01 LAB
REPORT: NORMAL
SARS-COV-2: NOT DETECTED
THIS TEST SENT TO: NORMAL

## 2020-08-20 ENCOUNTER — HOSPITAL ENCOUNTER (EMERGENCY)
Age: 25
Discharge: HOME | End: 2020-08-20
Payer: COMMERCIAL

## 2020-08-20 VITALS
RESPIRATION RATE: 19 BRPM | OXYGEN SATURATION: 95 % | TEMPERATURE: 98.4 F | BODY MASS INDEX: 27.44 KG/M2 | HEART RATE: 100 BPM | WEIGHT: 170 LBS | SYSTOLIC BLOOD PRESSURE: 115 MMHG | DIASTOLIC BLOOD PRESSURE: 71 MMHG

## 2020-08-20 LAB
BILIRUBIN URINE: NEGATIVE
BLOOD, URINE: NEGATIVE
CLARITY: CLEAR
COLOR: YELLOW
GLUCOSE URINE: NEGATIVE MG/DL
KETONES, URINE: NEGATIVE MG/DL
LEUKOCYTE ESTERASE, URINE: NEGATIVE
NITRITE, URINE: NEGATIVE
PH UA: 6.5 (ref 5–8)
PROTEIN UA: NEGATIVE MG/DL
SPECIFIC GRAVITY UA: 1.02 (ref 1–1.03)
UROBILINOGEN, URINE: 0.2 E.U./DL

## 2020-08-20 PROCEDURE — 81003 URINALYSIS AUTO W/O SCOPE: CPT

## 2020-08-20 PROCEDURE — 99284 EMERGENCY DEPT VISIT MOD MDM: CPT

## 2020-08-20 RX ORDER — DICYCLOMINE HCL 20 MG
20 TABLET ORAL EVERY 6 HOURS
COMMUNITY

## 2020-08-20 ASSESSMENT — ENCOUNTER SYMPTOMS
SORE THROAT: 0
NAUSEA: 0
APNEA: 0
VOMITING: 0
RHINORRHEA: 0
BACK PAIN: 0
EYE PAIN: 0
COLOR CHANGE: 0
ABDOMINAL PAIN: 0
COUGH: 0
PHOTOPHOBIA: 0
SHORTNESS OF BREATH: 0
ABDOMINAL DISTENTION: 0
EYE DISCHARGE: 0

## 2020-08-21 NOTE — ED PROVIDER NOTES
[facility] - [place] - [facility] EMERGENCY DEPT  eMERGENCYdEPARTMENT eNCOUnter      Pt Name: Мария Boyle  MRN: [id number]  [place] 1995  Date of evaluation: 8/20/2020  Provider:ADELIA Garsia    CHIEF COMPLAINT       Chief Complaint   Patient presents with    Dysuria     frequent urination         HISTORY OF PRESENT ILLNESS  (Location/Symptom, Timing/Onset, Context/Setting, Quality, Duration, Modifying Factors, Severity.)   Мария Boyle is a 26 y.o. female who presents to the emergency department with hx of frequency acute onset. She is autistic recent hx of UTI with bad reaction to bactrim. No fever here. Family denies discharge. No hematuria that is leonarda/obvious. Normal BM. Recent work up for fever infectious etiology rule normal chest and covid. Concern for today is recurrent UTI. HPI    Nursing Notes were reviewed and I agree. REVIEW OF SYSTEMS    (2-9 systems for level 4, 10 or more for level 5)     Review of Systems   Constitutional: Negative for activity change, appetite change, chills and fever. HENT: Negative for congestion, postnasal drip, rhinorrhea and sore throat. Eyes: Negative for photophobia, pain, discharge and visual disturbance. Respiratory: Negative for apnea, cough and shortness of breath. Cardiovascular: Negative for chest pain and leg swelling. Gastrointestinal: Negative for abdominal distention, abdominal pain, nausea and vomiting. Genitourinary: Positive for dysuria and frequency. Negative for flank pain, pelvic pain, urgency, vaginal bleeding and vaginal discharge. Musculoskeletal: Negative for arthralgias, back pain, joint swelling, neck pain and neck stiffness. Skin: Negative for color change and rash. Neurological: Negative for dizziness, syncope, facial asymmetry and headaches. Hematological: Negative for adenopathy. Does not bruise/bleed easily. Psychiatric/Behavioral: Negative for agitation, behavioral problems and confusion.         Except as noted above the remainder of the review of systems was reviewed and negative. PAST MEDICAL HISTORY     Past Medical History:   Diagnosis Date    Autism          SURGICAL HISTORY       Past Surgical History:   Procedure Laterality Date    HYSTERECTOMY           CURRENT MEDICATIONS       Discharge Medication List as of 8/20/2020  9:45 PM      CONTINUE these medications which have NOT CHANGED    Details   CLINDAMYCIN HCL PO Take by mouthHistorical Med      dicyclomine (BENTYL) 20 MG tablet Take 20 mg by mouth every 6 hoursHistorical Med      QUEtiapine (SEROQUEL) 200 MG tablet quetiapine 200 mg tablet   TAKE 1 TABLET BY MOUTH AT BEDTIMEHistorical Med      ALPRAZolam (XANAX) 0.5 MG tablet as needed. Historical Med      Psyllium (METAMUCIL PO) Take by mouthHistorical Med      ARIPiprazole (ABILIFY) 10 MG tablet Take 10 mg by mouth 2 times daily Historical Med      atorvastatin (LIPITOR) 10 MG tablet Take 10 mg by mouth dailyHistorical Med      Melatonin 5 MG CAPS melatonin 5 mg capsule   Take 1 capsule every day by oral route at bedtime for 30 days. Historical Med      metFORMIN (GLUCOPHAGE) 500 MG tablet metformin 500 mg tablet   1 tab BID   monitor for diarrheaHistorical Med      mirtazapine (REMERON) 15 MG tablet mirtazapine 15 mg tablet   TAKE 1 TABLET BY MOUTH AT BEDTIMEHistorical Med      TRAZODONE HCL PO Take 150 mg by mouth nightly 1/2 Tablet at bedtimeHistorical Med      chlorproMAZINE (THORAZINE) 25 MG tablet Take 25 mg by mouth 2 times daily       Citalopram Hydrobromide (CELEXA PO) Take 20 mg by mouth daily       Divalproex Sodium (DEPAKOTE PO) Take 500 mg by mouth 4 times daily       CLONIDINE HCL Take 0.15 mg by mouth daily Indications: at bedtime 1 1/2 tablets at bedtimeHistorical Med      dextromethorphan-quiNIDine (NUEDEXTA) 20-10 MG CAPS per capsule Take 1 capsule by mouth dailyHistorical Med             ALLERGIES     Bactrim [sulfamethoxazole-trimethoprim]    FAMILY HISTORY       Family History Head: Normocephalic and atraumatic. Right Ear: Tympanic membrane, ear canal and external ear normal.      Left Ear: Tympanic membrane, ear canal and external ear normal.      Nose: Nose normal.      Mouth/Throat:      Mouth: Mucous membranes are moist.      Pharynx: No oropharyngeal exudate. Eyes:      General:         Right eye: No discharge. Left eye: No discharge. Pupils: Pupils are equal, round, and reactive to light. Neck:      Musculoskeletal: Normal range of motion and neck supple. Thyroid: No thyromegaly. Cardiovascular:      Rate and Rhythm: Normal rate and regular rhythm. Pulses: Normal pulses. Heart sounds: Normal heart sounds. No murmur. No friction rub. Pulmonary:      Effort: Pulmonary effort is normal. No respiratory distress. Breath sounds: Normal breath sounds. No stridor. No wheezing. Abdominal:      General: Abdomen is flat. Bowel sounds are normal. There is no distension. Palpations: Abdomen is soft. Tenderness: There is no abdominal tenderness. Genitourinary:     Comments: Without findings of UTI discharge or pelvic tenderness on exam did not feel vaginal was warranted. Musculoskeletal: Normal range of motion. Skin:     General: Skin is warm and dry. Capillary Refill: Capillary refill takes less than 2 seconds. Findings: No rash. Neurological:      General: No focal deficit present. Mental Status: She is alert and oriented to person, place, and time. Mental status is at baseline. Cranial Nerves: No cranial nerve deficit. Sensory: No sensory deficit. Coordination: Coordination normal.   Psychiatric:         Mood and Affect: Mood normal.         Behavior: Behavior normal.         Thought Content:  Thought content normal.         Judgment: Judgment normal.           DIAGNOSTIC RESULTS     RADIOLOGY:   Non-plain film images such as CT, Ultrasound and MRI are read by the radiologist. Plain radiographic images are visualized and preliminarilyinterpreted by No att. providers found with the below findings:      Interpretation per the Radiologist below, if available at the time of this note:    No orders to display       LABS:  [address]       All other labs were within normal range or notreturned as of this dictation. RE-ASSESSMENT        EMERGENCY DEPARTMENT COURSE and DIFFERENTIAL DIAGNOSIS/MDM:   Vitals:    Vitals:    08/20/20 1956 08/20/20 2106   BP: 115/71    Pulse: 105 100   Resp: 19    Temp: 98.4 °F (36.9 °C)    TempSrc: Temporal    SpO2: 95%    Weight: 170 lb (77.1 kg)        MDM  With no findings on urinalysis and normal physical exam findings I think this symptom of \"frequency\" can be followed and for any persistence discussed with Gary Wright the PCP. Anything should worsen or change please return to ER. I make the patient and her caregiver aware of this which they are agreeable. I have made my attending aware who is agreeable to this plan of care at discharge. PROCEDURES:    Procedures      FINAL IMPRESSION      1.  Urinary frequency          DISPOSITION/PLAN   DISPOSITION Decision To Discharge 08/20/2020 09:45:28 PM      PATIENT REFERRED TO:  [address] [place] [address] EMERGENCY DEPT  [facility]  [phone]    If symptoms worsen      DISCHARGE MEDICATIONS:  Discharge Medication List as of 8/20/2020  9:45 PM          (Please note that portions of this note were completed with a voice recognition program.  Efforts were made to edit the dictations but occasionallywords are mis-transcribed.)    Carol Ann Mackay [address] [place] [address]  08/20/20 4309

## 2021-01-14 ENCOUNTER — LAB (OUTPATIENT)
Dept: LAB | Facility: HOSPITAL | Age: 26
End: 2021-01-14

## 2021-01-14 ENCOUNTER — TRANSCRIBE ORDERS (OUTPATIENT)
Dept: ADMINISTRATIVE | Facility: HOSPITAL | Age: 26
End: 2021-01-14

## 2021-01-14 DIAGNOSIS — Z51.81 ENCOUNTER FOR THERAPEUTIC DRUG LEVEL MONITORING: ICD-10-CM

## 2021-01-14 DIAGNOSIS — F31.60 BIPOLAR AFFECTIVE DISORDER, CURRENT EPISODE MIXED, CURRENT EPISODE SEVERITY UNSPECIFIED (HCC): Primary | ICD-10-CM

## 2021-01-14 DIAGNOSIS — E78.00 PURE HYPERCHOLESTEROLEMIA, UNSPECIFIED: ICD-10-CM

## 2021-01-14 LAB
ALBUMIN SERPL-MCNC: 4.3 G/DL (ref 3.5–5)
ALBUMIN/GLOB SERPL: 1.2 G/DL (ref 1.1–2.5)
ALP SERPL-CCNC: 47 U/L (ref 24–120)
ALT SERPL W P-5'-P-CCNC: 21 U/L (ref 0–35)
ANION GAP SERPL CALCULATED.3IONS-SCNC: 8 MMOL/L (ref 4–13)
AST SERPL-CCNC: 17 U/L (ref 7–45)
AUTO MIXED CELLS #: 0.9 10*3/MM3 (ref 0.1–2.6)
AUTO MIXED CELLS %: 10.4 % (ref 0.1–24)
BILIRUB SERPL-MCNC: 0.2 MG/DL (ref 0.1–1)
BUN SERPL-MCNC: 15 MG/DL (ref 5–21)
BUN/CREAT SERPL: 23.4
CALCIUM SPEC-SCNC: 9.8 MG/DL (ref 8.4–10.4)
CHLORIDE SERPL-SCNC: 110 MMOL/L (ref 98–110)
CHOLEST SERPL-MCNC: 171 MG/DL (ref 130–200)
CO2 SERPL-SCNC: 24 MMOL/L (ref 24–31)
CREAT SERPL-MCNC: 0.64 MG/DL (ref 0.5–1.4)
ERYTHROCYTE [DISTWIDTH] IN BLOOD BY AUTOMATED COUNT: 12 % (ref 12.3–15.4)
GFR SERPL CREATININE-BSD FRML MDRD: 114 ML/MIN/1.73
GLOBULIN UR ELPH-MCNC: 3.7 GM/DL
GLUCOSE SERPL-MCNC: 100 MG/DL (ref 70–100)
HBA1C MFR BLD: 5.2 % (ref 4.8–5.9)
HCT VFR BLD AUTO: 38.7 % (ref 34–46.6)
HDLC SERPL-MCNC: 83 MG/DL
HGB BLD-MCNC: 13.1 G/DL (ref 12–15.9)
LDLC SERPL CALC-MCNC: 67 MG/DL (ref 0–99)
LDLC/HDLC SERPL: 0.76 {RATIO}
LYMPHOCYTES # BLD AUTO: 3.1 10*3/MM3 (ref 0.7–3.1)
LYMPHOCYTES NFR BLD AUTO: 35.6 % (ref 19.6–45.3)
MCH RBC QN AUTO: 31.8 PG (ref 26.6–33)
MCHC RBC AUTO-ENTMCNC: 33.9 G/DL (ref 31.5–35.7)
MCV RBC AUTO: 93.9 FL (ref 79–97)
NEUTROPHILS NFR BLD AUTO: 4.6 10*3/MM3 (ref 1.7–7)
NEUTROPHILS NFR BLD AUTO: 54 % (ref 42.7–76)
PLATELET # BLD AUTO: 295 10*3/MM3 (ref 140–450)
PMV BLD AUTO: 10 FL (ref 6–12)
POTASSIUM SERPL-SCNC: 4.4 MMOL/L (ref 3.5–5.3)
PROT SERPL-MCNC: 8 G/DL (ref 6.3–8.7)
RBC # BLD AUTO: 4.12 10*6/MM3 (ref 3.77–5.28)
SODIUM SERPL-SCNC: 142 MMOL/L (ref 135–145)
TRIGL SERPL-MCNC: 123 MG/DL (ref 0–149)
VLDLC SERPL-MCNC: 21 MG/DL (ref 5–40)
WBC # BLD AUTO: 8.6 10*3/MM3 (ref 3.4–10.8)

## 2021-01-14 PROCEDURE — 84443 ASSAY THYROID STIM HORMONE: CPT | Performed by: NURSE PRACTITIONER

## 2021-01-14 PROCEDURE — 80061 LIPID PANEL: CPT | Performed by: NURSE PRACTITIONER

## 2021-01-14 PROCEDURE — 83036 HEMOGLOBIN GLYCOSYLATED A1C: CPT | Performed by: NURSE PRACTITIONER

## 2021-01-14 PROCEDURE — 80053 COMPREHEN METABOLIC PANEL: CPT | Performed by: NURSE PRACTITIONER

## 2021-01-14 PROCEDURE — 85025 COMPLETE CBC W/AUTO DIFF WBC: CPT | Performed by: NURSE PRACTITIONER

## 2021-01-14 PROCEDURE — 80164 ASSAY DIPROPYLACETIC ACD TOT: CPT | Performed by: NURSE PRACTITIONER

## 2021-01-14 PROCEDURE — 36415 COLL VENOUS BLD VENIPUNCTURE: CPT | Performed by: NURSE PRACTITIONER

## 2021-01-15 LAB
TSH SERPL DL<=0.05 MIU/L-ACNC: 1.8 UIU/ML (ref 0.27–4.2)
VALPROATE SERPL-MCNC: 83 MCG/ML (ref 50–125)

## 2021-01-18 ENCOUNTER — TELEPHONE (OUTPATIENT)
Dept: FAMILY MEDICINE CLINIC | Facility: CLINIC | Age: 26
End: 2021-01-18

## 2021-02-22 DIAGNOSIS — F84.0 AUTISTIC DISORDER: Primary | ICD-10-CM

## 2021-02-22 DIAGNOSIS — R52 PAIN: ICD-10-CM

## 2021-02-22 NOTE — TELEPHONE ENCOUNTER
Caller: [employer]    Relationship:     Best call back number: [phone]    Medication needed:   Requested Prescriptions     Pending Prescriptions Disp Refills   • acetaminophen (TYLENOL) 325 MG tablet        Sig: Take 2 tablets by mouth As Needed for Mild Pain .       When do you need the refill by: ASAP    What details did the patient provide when requesting the medication:    Patient is also needing Alpravolam sent in as well. Please send in any outdated prescriptions, as well.as well. They are requesting a 6 month supply.     [facility]-Fax: [phone]    Does the patient have less than a 3 day supply:  [x] Yes  [] No

## 2021-02-23 RX ORDER — ACETAMINOPHEN 325 MG/1
TABLET ORAL
Qty: 30 TABLET | Refills: 3 | Status: SHIPPED | OUTPATIENT
Start: 2021-02-23 | End: 2021-03-16 | Stop reason: SDUPTHER

## 2021-02-23 RX ORDER — ALPRAZOLAM 0.5 MG/1
TABLET ORAL
Qty: 30 TABLET | Refills: 0 | Status: SHIPPED | OUTPATIENT
Start: 2021-02-23 | End: 2021-03-10 | Stop reason: SDUPTHER

## 2021-03-08 RX ORDER — CITALOPRAM 20 MG/1
TABLET ORAL
Status: CANCELLED | OUTPATIENT
Start: 2021-03-08

## 2021-03-08 RX ORDER — CHLORPROMAZINE HYDROCHLORIDE 25 MG/1
TABLET, FILM COATED ORAL
Status: CANCELLED | OUTPATIENT
Start: 2021-03-08

## 2021-03-08 RX ORDER — ARIPIPRAZOLE 10 MG/1
10 TABLET ORAL DAILY
Status: CANCELLED | OUTPATIENT
Start: 2021-03-08

## 2021-03-08 RX ORDER — DIVALPROEX SODIUM 500 MG/1
TABLET, DELAYED RELEASE ORAL
Status: CANCELLED | OUTPATIENT
Start: 2021-03-08

## 2021-03-08 NOTE — TELEPHONE ENCOUNTER
Caller: ISAAC    Relationship: PHARMACIST AT [facility]    Best call back number: [phone]    Medication needed: SEE BELOW       When do you need the refill by: ASAP      TOPAMAX 50 MG TOTAL AT BEDTIME    LIPITOR 10 MG     SERIQUEL 200 MG AT BEDTIME    [facility] [place] [address], [place] [address]    PHARMACIST NPI [id number]

## 2021-03-10 ENCOUNTER — OFFICE VISIT (OUTPATIENT)
Dept: FAMILY MEDICINE CLINIC | Facility: CLINIC | Age: 26
End: 2021-03-10

## 2021-03-10 VITALS
DIASTOLIC BLOOD PRESSURE: 83 MMHG | OXYGEN SATURATION: 99 % | BODY MASS INDEX: 29.39 KG/M2 | RESPIRATION RATE: 16 BRPM | SYSTOLIC BLOOD PRESSURE: 120 MMHG | HEIGHT: 65 IN | TEMPERATURE: 97.5 F | WEIGHT: 176.4 LBS | HEART RATE: 84 BPM

## 2021-03-10 DIAGNOSIS — G47.00 INSOMNIA, UNSPECIFIED TYPE: ICD-10-CM

## 2021-03-10 DIAGNOSIS — F84.0 AUTISM SPECTRUM DISORDER: ICD-10-CM

## 2021-03-10 DIAGNOSIS — F31.60 BIPOLAR AFFECTIVE DISORDER, CURRENT EPISODE MIXED, CURRENT EPISODE SEVERITY UNSPECIFIED (HCC): Primary | ICD-10-CM

## 2021-03-10 DIAGNOSIS — F84.0 AUTISTIC DISORDER: ICD-10-CM

## 2021-03-10 DIAGNOSIS — F41.8 MIXED ANXIETY AND DEPRESSIVE DISORDER: Primary | ICD-10-CM

## 2021-03-10 DIAGNOSIS — E66.3 OVERWEIGHT: ICD-10-CM

## 2021-03-10 DIAGNOSIS — F41.8 MIXED ANXIETY AND DEPRESSIVE DISORDER: ICD-10-CM

## 2021-03-10 DIAGNOSIS — K58.9 IRRITABLE BOWEL SYNDROME, UNSPECIFIED TYPE: ICD-10-CM

## 2021-03-10 DIAGNOSIS — E78.00 HYPERCHOLESTEROLEMIA: ICD-10-CM

## 2021-03-10 PROCEDURE — 99214 OFFICE O/P EST MOD 30 MIN: CPT | Performed by: NURSE PRACTITIONER

## 2021-03-10 RX ORDER — CITALOPRAM 20 MG/1
TABLET ORAL
Status: CANCELLED | OUTPATIENT
Start: 2021-03-10

## 2021-03-10 RX ORDER — ALPRAZOLAM 0.5 MG/1
0.5 TABLET ORAL EVERY 8 HOURS PRN
Qty: 60 TABLET | Refills: 0 | Status: SHIPPED | OUTPATIENT
Start: 2021-03-10 | End: 2021-03-30 | Stop reason: SDUPTHER

## 2021-03-10 RX ORDER — QUETIAPINE FUMARATE 200 MG/1
200 TABLET, FILM COATED ORAL NIGHTLY
Qty: 30 TABLET | Refills: 2 | Status: SHIPPED | OUTPATIENT
Start: 2021-03-10 | End: 2021-06-09 | Stop reason: SDUPTHER

## 2021-03-10 RX ORDER — PSYLLIUM HUSK 0.4 G
0.52 CAPSULE ORAL DAILY
Qty: 30 CAPSULE | Refills: 2 | Status: SHIPPED | OUTPATIENT
Start: 2021-03-10 | End: 2021-06-24 | Stop reason: SDUPTHER

## 2021-03-10 RX ORDER — ATORVASTATIN CALCIUM 10 MG/1
10 TABLET, FILM COATED ORAL DAILY
Qty: 30 TABLET | Refills: 2 | Status: SHIPPED | OUTPATIENT
Start: 2021-03-10 | End: 2021-06-09 | Stop reason: SDUPTHER

## 2021-03-10 RX ORDER — DIVALPROEX SODIUM 500 MG/1
500 TABLET, DELAYED RELEASE ORAL 3 TIMES DAILY
Qty: 90 TABLET | Refills: 2 | Status: SHIPPED | OUTPATIENT
Start: 2021-03-10 | End: 2021-06-09 | Stop reason: SDUPTHER

## 2021-03-10 RX ORDER — DICYCLOMINE HCL 20 MG
20 TABLET ORAL 4 TIMES DAILY
Qty: 120 TABLET | Refills: 2 | Status: SHIPPED | OUTPATIENT
Start: 2021-03-10 | End: 2021-06-09 | Stop reason: SDUPTHER

## 2021-03-10 RX ORDER — CITALOPRAM 20 MG/1
20 TABLET ORAL DAILY
Qty: 30 TABLET | Refills: 2 | Status: SHIPPED | OUTPATIENT
Start: 2021-03-10 | End: 2021-06-09 | Stop reason: SDUPTHER

## 2021-03-10 RX ORDER — ARIPIPRAZOLE 20 MG/1
10 TABLET ORAL DAILY
Qty: 15 TABLET | Refills: 2 | Status: SHIPPED | OUTPATIENT
Start: 2021-03-10 | End: 2021-04-29 | Stop reason: SDUPTHER

## 2021-03-10 RX ORDER — TOPIRAMATE 50 MG/1
50 TABLET, FILM COATED ORAL 2 TIMES DAILY
COMMUNITY
End: 2021-03-10 | Stop reason: SDUPTHER

## 2021-03-10 RX ORDER — CHLORPROMAZINE HYDROCHLORIDE 25 MG/1
TABLET, FILM COATED ORAL
Status: CANCELLED | OUTPATIENT
Start: 2021-03-10

## 2021-03-10 RX ORDER — ARIPIPRAZOLE 10 MG/1
10 TABLET ORAL DAILY
Status: CANCELLED | OUTPATIENT
Start: 2021-03-10

## 2021-03-10 RX ORDER — ALPRAZOLAM 0.5 MG/1
TABLET ORAL
Qty: 30 TABLET | Refills: 0 | Status: CANCELLED | OUTPATIENT
Start: 2021-03-10

## 2021-03-10 RX ORDER — DIVALPROEX SODIUM 500 MG/1
TABLET, DELAYED RELEASE ORAL
Status: CANCELLED | OUTPATIENT
Start: 2021-03-10

## 2021-03-10 RX ORDER — CHLORPROMAZINE HYDROCHLORIDE 25 MG/1
25 TABLET, FILM COATED ORAL 2 TIMES DAILY
Qty: 60 TABLET | Refills: 2 | Status: SHIPPED | OUTPATIENT
Start: 2021-03-10 | End: 2021-06-09 | Stop reason: SDUPTHER

## 2021-03-10 RX ORDER — TRAZODONE HYDROCHLORIDE 150 MG/1
150 TABLET ORAL NIGHTLY
Qty: 30 TABLET | Refills: 2 | Status: SHIPPED | OUTPATIENT
Start: 2021-03-10 | End: 2021-06-09 | Stop reason: SDUPTHER

## 2021-03-10 RX ORDER — QUETIAPINE FUMARATE 400 MG/1
TABLET, FILM COATED ORAL
Status: CANCELLED | OUTPATIENT
Start: 2021-03-10

## 2021-03-10 RX ORDER — MIRTAZAPINE 15 MG/1
15 TABLET, FILM COATED ORAL NIGHTLY
Qty: 30 TABLET | Refills: 2 | Status: SHIPPED | OUTPATIENT
Start: 2021-03-10 | End: 2021-06-09

## 2021-03-10 RX ORDER — MELATONIN
1000 DAILY
COMMUNITY

## 2021-03-10 RX ORDER — CLONIDINE HYDROCHLORIDE 0.1 MG/1
0.1 TABLET ORAL NIGHTLY
Qty: 30 TABLET | Refills: 2 | Status: SHIPPED | OUTPATIENT
Start: 2021-03-10 | End: 2021-06-09 | Stop reason: SDUPTHER

## 2021-03-10 RX ORDER — TOPIRAMATE 50 MG/1
50 TABLET, FILM COATED ORAL DAILY
Qty: 30 TABLET | Refills: 2 | Status: SHIPPED | OUTPATIENT
Start: 2021-03-10 | End: 2021-06-09 | Stop reason: SDUPTHER

## 2021-03-10 RX ORDER — CHOLECALCIFEROL (VITAMIN D3) 125 MCG
5 CAPSULE ORAL NIGHTLY
Qty: 30 TABLET | Refills: 2 | Status: SHIPPED | OUTPATIENT
Start: 2021-03-10 | End: 2021-06-09 | Stop reason: SDUPTHER

## 2021-03-10 RX ORDER — PSYLLIUM SEED (WITH DEXTROSE)
POWDER (GRAM) ORAL DAILY
COMMUNITY
End: 2021-03-10 | Stop reason: SDUPTHER

## 2021-03-10 NOTE — TELEPHONE ENCOUNTER
Caller: [facility] [address] - [phone] [facility] [phone] FX    Relationship: Pharmacy    Best call back number:[phone]    Medication needed:   Requested Prescriptions     Pending Prescriptions Disp Refills   • ALPRAZolam (XANAX) 0.5 MG tablet 30 tablet 0     Si tablet every 8 hours PRN   • ARIPiprazole (ABILIFY) 10 MG tablet       Sig: Take 1 tablet by mouth Daily.   • divalproex (DEPAKOTE) 500 MG DR tablet       Sig: Take  by mouth.   • chlorproMAZINE (THORAZINE) 25 MG tablet       Sig: Take  by mouth.   • citalopram (CeleXA) 20 MG tablet       Sig: Take  by mouth.   • QUEtiapine (SEROquel) 400 MG tablet       Sig: Take  by mouth.       When do you need the refill by: 03/10/21    What details did the patient provide when requesting the medication:     Does the patient have less than a 3 day supply:  [x] Yes  [] No    What is the patient's preferred pharmacy: [facility] [place] - [phone] [facility] [phone] FX<br>[facility] - [facility] [address] [place] [address] - [phone] [facility] [phone] FX<br>[id number] - [place], [place] - [address] [facility] - [phone] [facility] [phone] FX

## 2021-03-10 NOTE — PROGRESS NOTES
"Chief Complaint  medication follow up    Subjective    History of Present Illness      Patient presents to [facility] for   She is doing much better with the medication to help with her appetite.       Review of Systems   Constitutional: Negative.    HENT: Negative.    Eyes: Negative.    Respiratory: Negative.    Cardiovascular: Negative.    Gastrointestinal: Negative.    Endocrine: Negative.    Genitourinary: Negative.    Musculoskeletal: Negative.    Skin: Negative.    Allergic/Immunologic: Negative.    Neurological: Negative.    Hematological: Negative.    Psychiatric/Behavioral: Negative.    All other systems reviewed and are negative.      I have reviewed and agree with the HPI and ROS information as above.  Mary Zurita, APRN     Objective   Vital Signs:   /83   Pulse 84   Temp 97.5 °F (36.4 °C)   Resp 16   Ht 165.1 cm (65\")   Wt 80 kg (176 lb 6.4 oz)   SpO2 99%   BMI 29.35 kg/m²       Physical Exam  Constitutional:       Appearance: Normal appearance. She is well-developed.      Comments: overweight   HENT:      Head: Normocephalic and atraumatic.      Right Ear: Tympanic membrane, ear canal and external ear normal.      Left Ear: Tympanic membrane, ear canal and external ear normal.      Nose: Nose normal. No septal deviation, nasal tenderness or congestion.      Mouth/Throat:      Lips: Pink. No lesions.      Mouth: Mucous membranes are moist. No oral lesions.      Dentition: Normal dentition.      Pharynx: Oropharynx is clear. No pharyngeal swelling, oropharyngeal exudate or posterior oropharyngeal erythema.   Eyes:      General: Lids are normal. Vision grossly intact. No scleral icterus.        Right eye: No discharge.         Left eye: No discharge.      Extraocular Movements: Extraocular movements intact.      Conjunctiva/sclera: Conjunctivae normal.      Right eye: Right conjunctiva is not injected.      Left eye: Left conjunctiva is not injected.      " Pupils: Pupils are equal, round, and reactive to light.   Neck:      Thyroid: No thyroid mass.      Trachea: Trachea normal.   Cardiovascular:      Rate and Rhythm: Normal rate and regular rhythm.      Heart sounds: Normal heart sounds. No murmur. No gallop.    Pulmonary:      Effort: Pulmonary effort is normal.      Breath sounds: Normal breath sounds and air entry. No wheezing, rhonchi or rales.   Abdominal:      General: There is no distension.      Palpations: Abdomen is soft. There is no mass.      Tenderness: There is no abdominal tenderness. There is no right CVA tenderness, left CVA tenderness, guarding or rebound.   Musculoskeletal:         General: No tenderness or deformity. Normal range of motion.      Cervical back: Full passive range of motion without pain, normal range of motion and neck supple.      Thoracic back: Normal.      Right lower leg: No edema.      Left lower leg: No edema.   Skin:     General: Skin is warm and dry.      Coloration: Skin is not jaundiced.      Findings: No rash.   Neurological:      Mental Status: She is alert and oriented to person, place, and time.      Cranial Nerves: Cranial nerves are intact.      Sensory: Sensation is intact.      Motor: Motor function is intact.      Coordination: Coordination is intact.      Gait: Gait is intact.      Deep Tendon Reflexes: Reflexes are normal and symmetric.   Psychiatric:         Mood and Affect: Mood and affect normal.         Judgment: Judgment normal.          Result Review  Data Reviewed:              Assessment and Plan    Patient's Body mass index is 29.35 kg/m².     Problem List Items Addressed This Visit        Cardiac and Vasculature    Hypercholesterolemia    Current Assessment & Plan     Refills sent. Labs due at next in office visit.          Relevant Medications    atorvastatin (LIPITOR) 10 MG tablet       Endocrine and Metabolic    Overweight    Current Assessment & Plan     Stable. Continue topamax.         Relevant  Medications    topiramate (TOPAMAX) 50 MG tablet       Gastrointestinal Abdominal     Irritable bowel syndrome    Current Assessment & Plan     Refills sent.          Relevant Medications    dicyclomine (BENTYL) 20 MG tablet    psyllium (Metamucil) 0.52 g capsule       Mental Health    Bipolar affective disorder, current episode mixed (CMS/HCC) - Primary    Current Assessment & Plan     Stable. Caregiver requesting refills. Sent at this time.          Relevant Medications    ARIPiprazole (Abilify) 20 MG tablet    chlorproMAZINE (THORAZINE) 25 MG tablet    divalproex (DEPAKOTE) 500 MG DR tablet    QUEtiapine (SEROquel) 200 MG tablet    citalopram (CeleXA) 20 MG tablet    mirtazapine (REMERON) 15 MG tablet    traZODone (DESYREL) 150 MG tablet    Mixed anxiety and depressive disorder    Current Assessment & Plan     Stable. Continue the same.          Relevant Medications    ARIPiprazole (Abilify) 20 MG tablet    chlorproMAZINE (THORAZINE) 25 MG tablet    QUEtiapine (SEROquel) 200 MG tablet    citalopram (CeleXA) 20 MG tablet    mirtazapine (REMERON) 15 MG tablet    traZODone (DESYREL) 150 MG tablet       Neuro    Autism spectrum disorder    Relevant Medications    ARIPiprazole (Abilify) 20 MG tablet    chlorproMAZINE (THORAZINE) 25 MG tablet    QUEtiapine (SEROquel) 200 MG tablet    citalopram (CeleXA) 20 MG tablet    mirtazapine (REMERON) 15 MG tablet    traZODone (DESYREL) 150 MG tablet       Sleep    Insomnia    Current Assessment & Plan     Stable. Refills sent.          Relevant Medications    cloNIDine (CATAPRES) 0.1 MG tablet    melatonin 5 MG tablet tablet    mirtazapine (REMERON) 15 MG tablet    traZODone (DESYREL) 150 MG tablet              Follow Up   No follow-ups on file.  Patient was given instructions and counseling regarding her condition or for health maintenance advice. Please see specific information pulled into the AVS if appropriate.

## 2021-03-16 DIAGNOSIS — R52 PAIN: ICD-10-CM

## 2021-03-16 RX ORDER — ACETAMINOPHEN 325 MG/1
TABLET ORAL
Qty: 30 TABLET | Refills: 3 | Status: SHIPPED | OUTPATIENT
Start: 2021-03-16 | End: 2021-08-20 | Stop reason: SDUPTHER

## 2021-03-16 NOTE — TELEPHONE ENCOUNTER
Caller: [facility] [address] - [phone] PH - [phone] FX    Relationship: Pharmacy    Best call back number: [phone]    Medication needed:   Requested Prescriptions     Pending Prescriptions Disp Refills   • acetaminophen (TYLENOL) 325 MG tablet 30 tablet 3     Si tablets every 4 hours as needed for pain or fever over 100.5. Do not exceed more than 6 doses in 24 hr period.       When do you need the refill by: 2021    What additional details did the patient provide when requesting the medication: PATIENT ALMOST OUT    Does the patient have less than a 3 day supply:  [x] Yes  [] No    What is the patient's preferred pharmacy: [facility] [place] - [phone] [facility] [phone] FX     
Home

## 2021-03-24 DIAGNOSIS — F84.0 AUTISM SPECTRUM DISORDER: ICD-10-CM

## 2021-03-24 DIAGNOSIS — F41.8 MIXED ANXIETY AND DEPRESSIVE DISORDER: ICD-10-CM

## 2021-03-24 RX ORDER — ALPRAZOLAM 0.5 MG/1
0.5 TABLET ORAL EVERY 8 HOURS PRN
Qty: 60 TABLET | Refills: 0 | OUTPATIENT
Start: 2021-03-24

## 2021-03-24 RX ORDER — ARIPIPRAZOLE 20 MG/1
10 TABLET ORAL DAILY
Qty: 15 TABLET | Refills: 2 | OUTPATIENT
Start: 2021-03-24

## 2021-03-24 NOTE — TELEPHONE ENCOUNTER
Caller: [facility] - [place] - [address] - [phone] [facility] [phone] FX    Relationship: Pharmacy    Best call back number:   Medication needed:   Requested Prescriptions     Pending Prescriptions Disp Refills   • ARIPiprazole (Abilify) 20 MG tablet 15 tablet 2     Sig: Take 0.5 tablets by mouth Daily.   • ALPRAZolam (XANAX) 0.5 MG tablet 60 tablet 0     Sig: Take 1 tablet by mouth Every 8 (Eight) Hours As Needed for Anxiety.   CLINDAMYCIN GEL 1-5%    When do you need the refill by: TODAY    Does the patient have less than a 3 day supply:  [x] Yes  [] No    What is the patient's preferred pharmacy: [facility] - [place] - [address] - [phone] [facility] [phone] FX

## 2021-03-30 DIAGNOSIS — F41.8 MIXED ANXIETY AND DEPRESSIVE DISORDER: ICD-10-CM

## 2021-03-30 DIAGNOSIS — L70.9 ACNE, UNSPECIFIED ACNE TYPE: Primary | ICD-10-CM

## 2021-03-30 RX ORDER — CLINDAMYCIN AND BENZOYL PEROXIDE 10; 50 MG/G; MG/G
GEL TOPICAL 2 TIMES DAILY
Qty: 50 G | Refills: 0 | Status: SHIPPED | OUTPATIENT
Start: 2021-03-30 | End: 2021-04-29 | Stop reason: SDUPTHER

## 2021-03-30 RX ORDER — ALPRAZOLAM 0.5 MG/1
0.5 TABLET ORAL EVERY 8 HOURS PRN
Qty: 60 TABLET | Refills: 0 | Status: SHIPPED | OUTPATIENT
Start: 2021-04-09 | End: 2021-08-20 | Stop reason: SDUPTHER

## 2021-03-30 NOTE — TELEPHONE ENCOUNTER
Pharmacy called requesting refill to put on file for the xanax for state records? Just to have the refill on file with an earliest refill date. Since this is PRN, do we need to send script for 30 days? Or how do we need to go about this?

## 2021-04-29 DIAGNOSIS — F84.0 AUTISM SPECTRUM DISORDER: ICD-10-CM

## 2021-04-29 DIAGNOSIS — L70.9 ACNE, UNSPECIFIED ACNE TYPE: ICD-10-CM

## 2021-04-29 NOTE — TELEPHONE ENCOUNTER
Talked with pharmacy and they said they did not have refills on abilify and Dr. Layton said ok to refill that and BenzaClin. Dr. Layton said will not refill Xanax until 5/9/2021 and pharmacy VU. States xanax is PRN only and 60 were given on 4/9/21.

## 2021-04-29 NOTE — TELEPHONE ENCOUNTER
Caller:   [facility] - [address] - [phone]  - [phone] FX (Pharmacy) [phone]         Relationship: PHARMACY     Best call back number: AS ABOVE     Medication needed:   ALPRAZolam (XANAX) 0.5 MG tablet  0.5 mg, Every 8 Hours PRN     ARIPiprazole (Abilify) 20 MG tablet  10 mg, Daily     clindamycin-benzoyl peroxide (BenzaClin) 1-5 % gel  2 Times Daily             When do you need the refill by: ASAP     What additional details did the patient provide when requesting the medication:  SOON     Does the patient have less than a 3 day supply:  [x] Yes  [] No    What is the patient's preferred pharmacy:    [facility] [place], [address] - [phone] PH - [phone] FX  [phone]

## 2021-04-30 RX ORDER — ARIPIPRAZOLE 20 MG/1
10 TABLET ORAL DAILY
Qty: 15 TABLET | Refills: 1 | Status: SHIPPED | OUTPATIENT
Start: 2021-04-30 | End: 2021-06-09

## 2021-04-30 RX ORDER — CLINDAMYCIN AND BENZOYL PEROXIDE 10; 50 MG/G; MG/G
GEL TOPICAL 2 TIMES DAILY
Qty: 50 G | Refills: 1 | Status: SHIPPED | OUTPATIENT
Start: 2021-04-30 | End: 2021-06-24 | Stop reason: SDUPTHER

## 2021-06-09 ENCOUNTER — OFFICE VISIT (OUTPATIENT)
Dept: FAMILY MEDICINE CLINIC | Facility: CLINIC | Age: 26
End: 2021-06-09

## 2021-06-09 VITALS
SYSTOLIC BLOOD PRESSURE: 108 MMHG | OXYGEN SATURATION: 97 % | TEMPERATURE: 98.1 F | HEART RATE: 115 BPM | DIASTOLIC BLOOD PRESSURE: 74 MMHG | HEIGHT: 65 IN | BODY MASS INDEX: 29.35 KG/M2

## 2021-06-09 DIAGNOSIS — L70.9 ACNE, UNSPECIFIED ACNE TYPE: ICD-10-CM

## 2021-06-09 DIAGNOSIS — E78.00 HYPERCHOLESTEROLEMIA: ICD-10-CM

## 2021-06-09 DIAGNOSIS — F79 INTELLECTUAL DISABILITY: ICD-10-CM

## 2021-06-09 DIAGNOSIS — G47.00 INSOMNIA, UNSPECIFIED TYPE: ICD-10-CM

## 2021-06-09 DIAGNOSIS — F31.60 BIPOLAR AFFECTIVE DISORDER, CURRENT EPISODE MIXED, CURRENT EPISODE SEVERITY UNSPECIFIED (HCC): Primary | ICD-10-CM

## 2021-06-09 DIAGNOSIS — F41.8 MIXED ANXIETY AND DEPRESSIVE DISORDER: ICD-10-CM

## 2021-06-09 DIAGNOSIS — K58.9 IRRITABLE BOWEL SYNDROME, UNSPECIFIED TYPE: ICD-10-CM

## 2021-06-09 DIAGNOSIS — E66.3 OVERWEIGHT: ICD-10-CM

## 2021-06-09 DIAGNOSIS — F84.0 AUTISM SPECTRUM DISORDER: ICD-10-CM

## 2021-06-09 PROCEDURE — 99214 OFFICE O/P EST MOD 30 MIN: CPT | Performed by: NURSE PRACTITIONER

## 2021-06-09 RX ORDER — ATORVASTATIN CALCIUM 10 MG/1
10 TABLET, FILM COATED ORAL DAILY
Qty: 30 TABLET | Refills: 2 | Status: SHIPPED | OUTPATIENT
Start: 2021-06-09 | End: 2021-09-10 | Stop reason: SDUPTHER

## 2021-06-09 RX ORDER — TOPIRAMATE 50 MG/1
50 TABLET, FILM COATED ORAL DAILY
Qty: 30 TABLET | Refills: 2 | Status: SHIPPED | OUTPATIENT
Start: 2021-06-09 | End: 2021-09-10 | Stop reason: SDUPTHER

## 2021-06-09 RX ORDER — CLONIDINE HYDROCHLORIDE 0.1 MG/1
0.1 TABLET ORAL NIGHTLY
Qty: 30 TABLET | Refills: 2 | Status: SHIPPED | OUTPATIENT
Start: 2021-06-09 | End: 2021-09-10 | Stop reason: SDUPTHER

## 2021-06-09 RX ORDER — DIVALPROEX SODIUM 500 MG/1
500 TABLET, DELAYED RELEASE ORAL 3 TIMES DAILY
Qty: 90 TABLET | Refills: 2 | Status: SHIPPED | OUTPATIENT
Start: 2021-06-09 | End: 2021-09-10 | Stop reason: SDUPTHER

## 2021-06-09 RX ORDER — CHLORPROMAZINE HYDROCHLORIDE 25 MG/1
25 TABLET, FILM COATED ORAL 2 TIMES DAILY
Qty: 60 TABLET | Refills: 2 | Status: SHIPPED | OUTPATIENT
Start: 2021-06-09 | End: 2021-09-10 | Stop reason: SDUPTHER

## 2021-06-09 RX ORDER — DICYCLOMINE HCL 20 MG
20 TABLET ORAL 4 TIMES DAILY
Qty: 120 TABLET | Refills: 2 | Status: SHIPPED | OUTPATIENT
Start: 2021-06-09 | End: 2021-09-08

## 2021-06-09 RX ORDER — CHOLECALCIFEROL (VITAMIN D3) 125 MCG
5 CAPSULE ORAL NIGHTLY
Qty: 30 TABLET | Refills: 2 | Status: SHIPPED | OUTPATIENT
Start: 2021-06-09 | End: 2021-09-10 | Stop reason: SDUPTHER

## 2021-06-09 RX ORDER — QUETIAPINE FUMARATE 200 MG/1
200 TABLET, FILM COATED ORAL NIGHTLY
Qty: 30 TABLET | Refills: 2 | Status: SHIPPED | OUTPATIENT
Start: 2021-06-09 | End: 2021-09-10 | Stop reason: SDUPTHER

## 2021-06-09 RX ORDER — ARIPIPRAZOLE 15 MG/1
15 TABLET ORAL DAILY
Qty: 30 TABLET | Refills: 2 | Status: SHIPPED | OUTPATIENT
Start: 2021-06-09 | End: 2021-09-09

## 2021-06-09 RX ORDER — CITALOPRAM 20 MG/1
20 TABLET ORAL DAILY
Qty: 30 TABLET | Refills: 2 | Status: SHIPPED | OUTPATIENT
Start: 2021-06-09 | End: 2021-09-10 | Stop reason: SDUPTHER

## 2021-06-09 RX ORDER — TRAZODONE HYDROCHLORIDE 150 MG/1
150 TABLET ORAL NIGHTLY
Qty: 30 TABLET | Refills: 2 | Status: SHIPPED | OUTPATIENT
Start: 2021-06-09 | End: 2021-06-24 | Stop reason: SDUPTHER

## 2021-06-12 NOTE — PATIENT INSTRUCTIONS
Discharge Instructions - Mood Disorder Follow Up     - You will need to return to the office as instructed for follow up, or sooner if you develop symptoms  - Medication should be taken first thing in the morning  - It is your responsibility to safe guard your medication  - If you develop any symptoms such as headache, changes in weight, loss of appetite, chest pain, palpitations, or change in behavior, please contact our office immediately or go to your local emergency rooms for any emergent needs.  - Your next appointment will be walk in visit.  Dr. Layton is here Monday-Thursdays, and you will need to be signed in by 3 pm in order to guarantee your prescription is filled that day.  You may be seen on Fridays or Saturdays for medication follow up as well.

## 2021-06-23 DIAGNOSIS — E78.00 HYPERCHOLESTEROLEMIA: ICD-10-CM

## 2021-06-23 DIAGNOSIS — F31.60 BIPOLAR AFFECTIVE DISORDER, CURRENT EPISODE MIXED, CURRENT EPISODE SEVERITY UNSPECIFIED (HCC): ICD-10-CM

## 2021-06-23 DIAGNOSIS — E66.3 OVERWEIGHT: ICD-10-CM

## 2021-06-23 DIAGNOSIS — F84.0 AUTISM SPECTRUM DISORDER: ICD-10-CM

## 2021-06-23 DIAGNOSIS — F41.8 MIXED ANXIETY AND DEPRESSIVE DISORDER: ICD-10-CM

## 2021-06-23 DIAGNOSIS — K58.9 IRRITABLE BOWEL SYNDROME, UNSPECIFIED TYPE: ICD-10-CM

## 2021-06-23 DIAGNOSIS — L70.9 ACNE, UNSPECIFIED ACNE TYPE: ICD-10-CM

## 2021-06-23 DIAGNOSIS — G47.00 INSOMNIA, UNSPECIFIED TYPE: ICD-10-CM

## 2021-06-23 RX ORDER — DIVALPROEX SODIUM 500 MG/1
500 TABLET, DELAYED RELEASE ORAL 3 TIMES DAILY
Qty: 90 TABLET | Refills: 2 | OUTPATIENT
Start: 2021-06-23

## 2021-06-23 RX ORDER — CLONIDINE HYDROCHLORIDE 0.1 MG/1
0.1 TABLET ORAL NIGHTLY
Qty: 30 TABLET | Refills: 2 | OUTPATIENT
Start: 2021-06-23

## 2021-06-23 RX ORDER — ATORVASTATIN CALCIUM 10 MG/1
10 TABLET, FILM COATED ORAL DAILY
Qty: 30 TABLET | Refills: 2 | OUTPATIENT
Start: 2021-06-23

## 2021-06-23 RX ORDER — CHOLECALCIFEROL (VITAMIN D3) 125 MCG
5 CAPSULE ORAL NIGHTLY
Qty: 30 TABLET | Refills: 2 | OUTPATIENT
Start: 2021-06-23

## 2021-06-23 RX ORDER — CHLORPROMAZINE HYDROCHLORIDE 25 MG/1
25 TABLET, FILM COATED ORAL 2 TIMES DAILY
Qty: 60 TABLET | Refills: 2 | OUTPATIENT
Start: 2021-06-23

## 2021-06-23 RX ORDER — DICYCLOMINE HCL 20 MG
20 TABLET ORAL 4 TIMES DAILY
Qty: 120 TABLET | Refills: 2 | OUTPATIENT
Start: 2021-06-23

## 2021-06-23 RX ORDER — ALPRAZOLAM 0.5 MG/1
0.5 TABLET ORAL EVERY 8 HOURS PRN
Qty: 60 TABLET | Refills: 0 | OUTPATIENT
Start: 2021-06-23

## 2021-06-23 RX ORDER — CLINDAMYCIN AND BENZOYL PEROXIDE 10; 50 MG/G; MG/G
GEL TOPICAL 2 TIMES DAILY
Qty: 50 G | Refills: 1 | OUTPATIENT
Start: 2021-06-23

## 2021-06-23 RX ORDER — TOPIRAMATE 50 MG/1
50 TABLET, FILM COATED ORAL DAILY
Qty: 30 TABLET | Refills: 2 | OUTPATIENT
Start: 2021-06-23

## 2021-06-23 RX ORDER — TRAZODONE HYDROCHLORIDE 150 MG/1
150 TABLET ORAL NIGHTLY
Qty: 30 TABLET | Refills: 2 | OUTPATIENT
Start: 2021-06-23

## 2021-06-23 RX ORDER — QUETIAPINE FUMARATE 200 MG/1
200 TABLET, FILM COATED ORAL NIGHTLY
Qty: 30 TABLET | Refills: 2 | OUTPATIENT
Start: 2021-06-23

## 2021-06-23 RX ORDER — CITALOPRAM 20 MG/1
20 TABLET ORAL DAILY
Qty: 30 TABLET | Refills: 2 | OUTPATIENT
Start: 2021-06-23

## 2021-06-23 RX ORDER — MELATONIN
1000 DAILY
OUTPATIENT
Start: 2021-06-23

## 2021-06-23 RX ORDER — ARIPIPRAZOLE 15 MG/1
15 TABLET ORAL DAILY
Qty: 30 TABLET | Refills: 2 | OUTPATIENT
Start: 2021-06-23

## 2021-06-23 RX ORDER — PSYLLIUM HUSK 0.4 G
0.52 CAPSULE ORAL DAILY
Qty: 30 CAPSULE | Refills: 2 | OUTPATIENT
Start: 2021-06-23 | End: 2022-06-23

## 2021-06-23 NOTE — TELEPHONE ENCOUNTER
Caller: Krista Kauffman    Relationship: Self    Best call back number: [phone]    Medication needed:   Requested Prescriptions     Pending Prescriptions Disp Refills   • clindamycin-benzoyl peroxide (BenzaClin) 1-5 % gel 50 g 1     Sig: Apply  topically to the appropriate area as directed 2 (Two) Times a Day.   • traZODone (DESYREL) 150 MG tablet 30 tablet 2     Sig: Take 1 tablet by mouth Every Night. Patient takes 1/2 tablet at bedtime   • psyllium (Metamucil) 0.52 g capsule 30 capsule 2     Sig: Take 1 capsule by mouth Daily.   • ARIPiprazole (Abilify) 15 MG tablet 30 tablet 2     Sig: Take 1 tablet by mouth Daily.   • atorvastatin (LIPITOR) 10 MG tablet 30 tablet 2     Sig: Take 1 tablet by mouth Daily.   • cloNIDine (CATAPRES) 0.1 MG tablet 30 tablet 2     Sig: Take 1 tablet by mouth Every Night. Patient takes 1.5mg at bedtime   • dicyclomine (BENTYL) 20 MG tablet 120 tablet 2     Sig: Take 1 tablet by mouth 4 (Four) Times a Day.   • melatonin 5 MG tablet tablet 30 tablet 2     Sig: Take 1 tablet by mouth Every Night.   • citalopram (CeleXA) 20 MG tablet 30 tablet 2     Sig: Take 1 tablet by mouth Daily.   • QUEtiapine (SEROquel) 200 MG tablet 30 tablet 2     Sig: Take 1 tablet by mouth Every Night.   • chlorproMAZINE (THORAZINE) 25 MG tablet 60 tablet 2     Sig: Take 1 tablet by mouth 2 (Two) Times a Day.   • divalproex (DEPAKOTE) 500 MG DR tablet 90 tablet 2     Sig: Take 1 tablet by mouth 3 (Three) Times a Day.   • ALPRAZolam (XANAX) 0.5 MG tablet 60 tablet 0     Sig: Take 1 tablet by mouth Every 8 (Eight) Hours As Needed for Anxiety.   • cholecalciferol (VITAMIN D3) 25 MCG (1000 UT) tablet       Sig: Take 1 tablet by mouth Daily.   • topiramate (TOPAMAX) 50 MG tablet 30 tablet 2     Sig: Take 1 tablet by mouth Daily.       When do you need the refill by: ASAP        Does the patient have less than a 3 day supply:  [x] Yes  [] No    What is the patient's preferred pharmacy: [facility] -  [place] - [address] - [phone] [facility] [phone] FX

## 2021-06-24 DIAGNOSIS — K58.9 IRRITABLE BOWEL SYNDROME, UNSPECIFIED TYPE: ICD-10-CM

## 2021-06-24 DIAGNOSIS — G47.00 INSOMNIA, UNSPECIFIED TYPE: ICD-10-CM

## 2021-06-24 DIAGNOSIS — L70.9 ACNE, UNSPECIFIED ACNE TYPE: ICD-10-CM

## 2021-06-24 RX ORDER — MIRTAZAPINE 15 MG/1
15 TABLET, FILM COATED ORAL NIGHTLY
Qty: 30 TABLET | Refills: 5 | Status: SHIPPED | OUTPATIENT
Start: 2021-06-24 | End: 2021-08-20

## 2021-06-24 RX ORDER — PSYLLIUM HUSK 0.4 G
0.52 CAPSULE ORAL DAILY
Qty: 30 CAPSULE | Refills: 5 | Status: SHIPPED | OUTPATIENT
Start: 2021-06-24 | End: 2021-12-17

## 2021-06-24 RX ORDER — TRAZODONE HYDROCHLORIDE 150 MG/1
150 TABLET ORAL NIGHTLY
Qty: 30 TABLET | Refills: 2 | Status: SHIPPED | OUTPATIENT
Start: 2021-06-24 | End: 2021-09-10 | Stop reason: SDUPTHER

## 2021-06-24 RX ORDER — CLINDAMYCIN AND BENZOYL PEROXIDE 10; 50 MG/G; MG/G
GEL TOPICAL 2 TIMES DAILY
Qty: 50 G | Refills: 1 | Status: SHIPPED | OUTPATIENT
Start: 2021-06-24 | End: 2021-08-20 | Stop reason: SDUPTHER

## 2021-08-19 DIAGNOSIS — L70.9 ACNE, UNSPECIFIED ACNE TYPE: ICD-10-CM

## 2021-08-19 RX ORDER — CLINDAMYCIN AND BENZOYL PEROXIDE 10; 50 MG/G; MG/G
GEL TOPICAL
Qty: 50 G | Refills: 1 | OUTPATIENT
Start: 2021-08-19

## 2021-08-20 ENCOUNTER — OFFICE VISIT (OUTPATIENT)
Dept: FAMILY MEDICINE CLINIC | Facility: CLINIC | Age: 26
End: 2021-08-20

## 2021-08-20 ENCOUNTER — LAB (OUTPATIENT)
Dept: LAB | Facility: HOSPITAL | Age: 26
End: 2021-08-20

## 2021-08-20 VITALS
WEIGHT: 174 LBS | DIASTOLIC BLOOD PRESSURE: 68 MMHG | BODY MASS INDEX: 29.71 KG/M2 | RESPIRATION RATE: 20 BRPM | TEMPERATURE: 97.7 F | HEIGHT: 64 IN | SYSTOLIC BLOOD PRESSURE: 120 MMHG

## 2021-08-20 DIAGNOSIS — F41.8 MIXED ANXIETY AND DEPRESSIVE DISORDER: ICD-10-CM

## 2021-08-20 DIAGNOSIS — R52 PAIN: ICD-10-CM

## 2021-08-20 DIAGNOSIS — F84.0 AUTISM: ICD-10-CM

## 2021-08-20 DIAGNOSIS — R35.0 URINARY FREQUENCY: Primary | ICD-10-CM

## 2021-08-20 DIAGNOSIS — L70.9 ACNE, UNSPECIFIED ACNE TYPE: ICD-10-CM

## 2021-08-20 DIAGNOSIS — R35.0 URINARY FREQUENCY: ICD-10-CM

## 2021-08-20 LAB
BILIRUB UR QL STRIP: NEGATIVE
CLARITY UR: CLEAR
COLOR UR: YELLOW
GLUCOSE UR STRIP-MCNC: NEGATIVE MG/DL
HGB UR QL STRIP.AUTO: NEGATIVE
KETONES UR QL STRIP: NEGATIVE
LEUKOCYTE ESTERASE UR QL STRIP.AUTO: NEGATIVE
NITRITE UR QL STRIP: NEGATIVE
PH UR STRIP.AUTO: 8.5 [PH] (ref 5–8)
PROT UR QL STRIP: NEGATIVE
SP GR UR STRIP: 1.02 (ref 1–1.03)
UROBILINOGEN UR QL STRIP: ABNORMAL

## 2021-08-20 PROCEDURE — 99214 OFFICE O/P EST MOD 30 MIN: CPT | Performed by: NURSE PRACTITIONER

## 2021-08-20 PROCEDURE — 81003 URINALYSIS AUTO W/O SCOPE: CPT

## 2021-08-20 RX ORDER — CLINDAMYCIN AND BENZOYL PEROXIDE 10; 50 MG/G; MG/G
GEL TOPICAL 2 TIMES DAILY
Qty: 50 G | Refills: 1 | Status: SHIPPED | OUTPATIENT
Start: 2021-08-20 | End: 2021-10-01

## 2021-08-20 RX ORDER — ALPRAZOLAM 0.5 MG/1
0.5 TABLET ORAL EVERY 8 HOURS PRN
Qty: 60 TABLET | Refills: 0 | Status: SHIPPED | OUTPATIENT
Start: 2021-08-20 | End: 2021-09-07

## 2021-08-20 RX ORDER — ACETAMINOPHEN 325 MG/1
TABLET ORAL
Qty: 30 TABLET | Refills: 3 | Status: SHIPPED | OUTPATIENT
Start: 2021-08-20 | End: 2022-01-11

## 2021-08-20 NOTE — PROGRESS NOTES
"Chief Complaint  Urinary Frequency (Patient's caregiver states that she has urinary frequency. Pt has also been more irritable. ) Patient's caregiver states that she has urinary frequency. Pt has also been more irritable.     Subjective    History of Present Illness      Patient presents to [facility] for   Urinary Frequency   This is a new problem. The current episode started in the past 7 days. The problem has been gradually worsening. There has been no fever. She is not sexually active. Associated symptoms include frequency and urgency.        Review of Systems   Genitourinary: Positive for frequency and urgency.       I have reviewed and agree with the HPI and ROS information as above.  Eva De La Torre, APRFELIPE     Objective   Vital Signs:   /68   Temp 97.7 °F (36.5 °C)   Resp 20   Ht 162.6 cm (64\")   Wt 78.9 kg (174 lb)   BMI 29.87 kg/m²       Physical Exam  Constitutional:       Appearance: Normal appearance. She is well-developed.   HENT:      Head: Normocephalic and atraumatic.      Right Ear: External ear normal.      Left Ear: External ear normal.      Nose: Nose normal. No nasal tenderness or congestion.      Mouth/Throat:      Lips: Pink. No lesions.      Mouth: Mucous membranes are moist. No oral lesions.      Dentition: Normal dentition.      Pharynx: Oropharynx is clear. No pharyngeal swelling, oropharyngeal exudate or posterior oropharyngeal erythema.   Eyes:      General: Lids are normal. Vision grossly intact. No scleral icterus.        Right eye: No discharge.         Left eye: No discharge.      Extraocular Movements: Extraocular movements intact.      Conjunctiva/sclera: Conjunctivae normal.      Right eye: Right conjunctiva is not injected.      Left eye: Left conjunctiva is not injected.      Pupils: Pupils are equal, round, and reactive to light.   Cardiovascular:      Rate and Rhythm: Normal rate and regular rhythm.      Heart sounds: Normal heart " sounds. No murmur heard.   No gallop.    Pulmonary:      Effort: Pulmonary effort is normal.      Breath sounds: Normal breath sounds and air entry. No wheezing, rhonchi or rales.   Musculoskeletal:         General: No tenderness or deformity. Normal range of motion.      Cervical back: Full passive range of motion without pain, normal range of motion and neck supple.      Right lower leg: No edema.      Left lower leg: No edema.   Skin:     General: Skin is warm and dry.      Coloration: Skin is not jaundiced.      Findings: No rash.   Neurological:      Mental Status: She is alert and oriented to person, place, and time.      Cranial Nerves: Cranial nerves are intact.      Sensory: Sensation is intact.      Motor: Motor function is intact.      Coordination: Coordination is intact.      Gait: Gait is intact.   Psychiatric:         Attention and Perception: Attention normal.         Mood and Affect: Mood and affect normal.         Speech: She is noncommunicative.         Behavior: Behavior is aggressive. Behavior is not hyperactive. Behavior is cooperative.         Thought Content: Thought content normal.         Judgment: Judgment normal.          Result Review  Data Reviewed:     UA    Urinalysis 8/20/21   Specific Gravity, UA 1.025   Ketones, UA Negative   Blood, UA Negative   Leukocytes, UA Negative   Nitrite, UA Negative                       Assessment and Plan      Problem List Items Addressed This Visit        Neuro    Autism      Other Visit Diagnoses     Urinary frequency    -  Primary    Relevant Orders    Urinalysis With Culture If Indicated - Urine, Clean Catch (Completed)    Acne, unspecified acne type        Relevant Medications    clindamycin-benzoyl peroxide (BenzaClin) 1-5 % gel    Pain        Relevant Medications    acetaminophen (TYLENOL) 325 MG tablet       pH of Urine slightly elevated. Otherwise urinalysis WNL. Instructed caregiver to make sure pt drinks more water. She is currently drinking  mostly Lemonade and Gatorade. If sympoms continue or worsen RTC for repeat Urinalysis.          Follow Up   Return in about 3 months (around 11/20/2021).  Patient was given instructions and counseling regarding her condition or for health maintenance advice. Please see specific information pulled into the AVS if appropriate.

## 2021-08-24 DIAGNOSIS — F31.60 BIPOLAR AFFECTIVE DISORDER, CURRENT EPISODE MIXED, CURRENT EPISODE SEVERITY UNSPECIFIED (HCC): ICD-10-CM

## 2021-08-24 NOTE — TELEPHONE ENCOUNTER
Caller: [facility] - [place], [place] - [address] - [phone] [facility] [phone] FX    Relationship: PHARMACY     Best call back number: [phone]  Medication needed:   Requested Prescriptions     Pending Prescriptions Disp Refills   • ARIPiprazole (ABILIFY) 15 MG tablet [Pharmacy Med Name: aripiprazole 15 mg tablet] 30 tablet 2     Sig: GIVE ONE Tablet BY MOUTH DAILY       When do you need the refill by: TODAY   What additional details did the patient provide when requesting the medication: NONE     Does the patient have less than a 3 day supply:  [x] Yes  [] No    What is the patient's preferred pharmacy: [facility] - [place], [place] [address] - [phone] [facility] [phone] FX

## 2021-08-25 RX ORDER — ARIPIPRAZOLE 15 MG/1
TABLET ORAL
Qty: 30 TABLET | Refills: 2 | OUTPATIENT
Start: 2021-08-25

## 2021-08-25 NOTE — TELEPHONE ENCOUNTER
Spoke with pharmacy and told them ok to refill pt medications x 1 month and then she will be due in office apt.

## 2021-09-03 ENCOUNTER — TELEPHONE (OUTPATIENT)
Dept: FAMILY MEDICINE CLINIC | Facility: CLINIC | Age: 26
End: 2021-09-03

## 2021-09-03 NOTE — TELEPHONE ENCOUNTER
Pt caregiver called wanting to know if we could call in pt rx since she was just here for a UTI. I told her that more than likely she would need an appointment and offered her a Telehealth visit due to the severity of pt behavior in public. I also offered to ask Olivier about any courtesy refills and pt caregiver would like to see if we can send anything in prior to an appointment. Sent to Olivier for direction.

## 2021-09-04 DIAGNOSIS — F41.8 MIXED ANXIETY AND DEPRESSIVE DISORDER: ICD-10-CM

## 2021-09-07 RX ORDER — ALPRAZOLAM 0.5 MG/1
TABLET ORAL
Qty: 60 TABLET | Refills: 0 | Status: SHIPPED | OUTPATIENT
Start: 2021-09-07 | End: 2021-09-23

## 2021-09-08 DIAGNOSIS — K58.9 IRRITABLE BOWEL SYNDROME, UNSPECIFIED TYPE: ICD-10-CM

## 2021-09-08 DIAGNOSIS — E66.3 OVERWEIGHT: ICD-10-CM

## 2021-09-08 DIAGNOSIS — F41.8 MIXED ANXIETY AND DEPRESSIVE DISORDER: ICD-10-CM

## 2021-09-08 DIAGNOSIS — F84.0 AUTISM SPECTRUM DISORDER: ICD-10-CM

## 2021-09-08 DIAGNOSIS — G47.00 INSOMNIA, UNSPECIFIED TYPE: ICD-10-CM

## 2021-09-08 DIAGNOSIS — E78.00 HYPERCHOLESTEROLEMIA: ICD-10-CM

## 2021-09-08 DIAGNOSIS — F31.60 BIPOLAR AFFECTIVE DISORDER, CURRENT EPISODE MIXED, CURRENT EPISODE SEVERITY UNSPECIFIED (HCC): ICD-10-CM

## 2021-09-08 RX ORDER — DICYCLOMINE HCL 20 MG
TABLET ORAL
Qty: 120 TABLET | Refills: 0 | Status: SHIPPED | OUTPATIENT
Start: 2021-09-08 | End: 2021-10-12

## 2021-09-09 ENCOUNTER — OFFICE VISIT (OUTPATIENT)
Dept: FAMILY MEDICINE CLINIC | Facility: CLINIC | Age: 26
End: 2021-09-09

## 2021-09-09 VITALS
HEART RATE: 76 BPM | TEMPERATURE: 97.2 F | WEIGHT: 169 LBS | HEIGHT: 65 IN | SYSTOLIC BLOOD PRESSURE: 104 MMHG | BODY MASS INDEX: 28.16 KG/M2 | DIASTOLIC BLOOD PRESSURE: 69 MMHG | RESPIRATION RATE: 20 BRPM

## 2021-09-09 DIAGNOSIS — E78.00 HYPERCHOLESTEROLEMIA: ICD-10-CM

## 2021-09-09 DIAGNOSIS — F84.0 AUTISM SPECTRUM DISORDER: ICD-10-CM

## 2021-09-09 DIAGNOSIS — E66.3 OVERWEIGHT: ICD-10-CM

## 2021-09-09 DIAGNOSIS — G47.00 INSOMNIA, UNSPECIFIED TYPE: ICD-10-CM

## 2021-09-09 DIAGNOSIS — L65.9 HAIR LOSS: ICD-10-CM

## 2021-09-09 DIAGNOSIS — F41.8 MIXED ANXIETY AND DEPRESSIVE DISORDER: ICD-10-CM

## 2021-09-09 DIAGNOSIS — F31.60 BIPOLAR AFFECTIVE DISORDER, CURRENT EPISODE MIXED, CURRENT EPISODE SEVERITY UNSPECIFIED (HCC): ICD-10-CM

## 2021-09-09 DIAGNOSIS — F31.60 BIPOLAR AFFECTIVE DISORDER, CURRENT EPISODE MIXED, CURRENT EPISODE SEVERITY UNSPECIFIED (HCC): Primary | ICD-10-CM

## 2021-09-09 DIAGNOSIS — F79 INTELLECTUAL DISABILITY: ICD-10-CM

## 2021-09-09 PROCEDURE — 99214 OFFICE O/P EST MOD 30 MIN: CPT | Performed by: NURSE PRACTITIONER

## 2021-09-09 RX ORDER — QUETIAPINE FUMARATE 200 MG/1
TABLET, FILM COATED ORAL
Qty: 30 TABLET | Refills: 2 | OUTPATIENT
Start: 2021-09-09

## 2021-09-09 RX ORDER — ARIPIPRAZOLE 20 MG/1
20 TABLET ORAL DAILY
Qty: 30 TABLET | Refills: 1 | Status: SHIPPED | OUTPATIENT
Start: 2021-09-09 | End: 2021-09-23

## 2021-09-09 RX ORDER — DIVALPROEX SODIUM 500 MG/1
TABLET, DELAYED RELEASE ORAL
Qty: 90 TABLET | Refills: 2 | OUTPATIENT
Start: 2021-09-09

## 2021-09-09 RX ORDER — ATORVASTATIN CALCIUM 10 MG/1
TABLET, FILM COATED ORAL
Qty: 30 TABLET | Refills: 2 | OUTPATIENT
Start: 2021-09-09

## 2021-09-09 RX ORDER — CHOLECALCIFEROL (VITAMIN D3) 125 MCG
CAPSULE ORAL
Qty: 30 TABLET | Refills: 2 | OUTPATIENT
Start: 2021-09-09

## 2021-09-09 RX ORDER — CLONIDINE HYDROCHLORIDE 0.1 MG/1
TABLET ORAL
Qty: 45 TABLET | Refills: 2 | OUTPATIENT
Start: 2021-09-09

## 2021-09-09 RX ORDER — CHLORPROMAZINE HYDROCHLORIDE 25 MG/1
TABLET, FILM COATED ORAL
Qty: 60 TABLET | Refills: 2 | OUTPATIENT
Start: 2021-09-09

## 2021-09-09 RX ORDER — TOPIRAMATE 50 MG/1
TABLET, FILM COATED ORAL
Qty: 30 TABLET | Refills: 2 | OUTPATIENT
Start: 2021-09-09

## 2021-09-09 RX ORDER — ARIPIPRAZOLE 15 MG/1
TABLET ORAL
Qty: 30 TABLET | Refills: 0 | OUTPATIENT
Start: 2021-09-09

## 2021-09-09 RX ORDER — CITALOPRAM 20 MG/1
TABLET ORAL
Qty: 30 TABLET | Refills: 2 | OUTPATIENT
Start: 2021-09-09

## 2021-09-09 NOTE — PROGRESS NOTES
"Chief Complaint  paperwork to be filled out    Subjective    History of Present Illness      Patient presents to [facility] for   Pt's caretaker states that pt has paperwork to be filled out for [place] [employer].  Caregiver states that she is more aggressive and she is nervous to have her in public.  Has more outburst and has been having to use her Xanax more lately.  She also feels that her hair is thinning and she wonders if it is hormone related.       Review of Systems   HENT:        Hair loss   Psychiatric/Behavioral: Positive for agitation and behavioral problems.       I have reviewed and agree with the HPI and ROS information as above.  Manuela Camivikram Amaya, APRN     Objective   Vital Signs:   /69   Pulse 76   Temp 97.2 °F (36.2 °C)   Resp 20   Ht 165.1 cm (65\")   Wt 76.7 kg (169 lb)   BMI 28.12 kg/m²       Physical Exam  Constitutional:       Appearance: Normal appearance. She is well-developed.   HENT:      Head: Normocephalic and atraumatic.      Right Ear: Tympanic membrane, ear canal and external ear normal.      Left Ear: Tympanic membrane, ear canal and external ear normal.      Nose: Nose normal. No septal deviation, nasal tenderness or congestion.      Mouth/Throat:      Lips: Pink. No lesions.      Mouth: Mucous membranes are moist. No oral lesions.      Dentition: Normal dentition.      Pharynx: Oropharynx is clear. No pharyngeal swelling, oropharyngeal exudate or posterior oropharyngeal erythema.   Eyes:      General: Lids are normal. Vision grossly intact. No scleral icterus.        Right eye: No discharge.         Left eye: No discharge.      Extraocular Movements: Extraocular movements intact.      Conjunctiva/sclera: Conjunctivae normal.      Right eye: Right conjunctiva is not injected.      Left eye: Left conjunctiva is not injected.      Pupils: Pupils are equal, round, and reactive to light.   Neck:      Thyroid: No thyroid mass.      " Trachea: Trachea normal.   Cardiovascular:      Rate and Rhythm: Normal rate and regular rhythm.      Heart sounds: Normal heart sounds. No murmur heard.   No gallop.    Pulmonary:      Effort: Pulmonary effort is normal.      Breath sounds: Normal breath sounds and air entry. No wheezing, rhonchi or rales.   Abdominal:      General: There is no distension.      Palpations: Abdomen is soft. There is no mass.      Tenderness: There is no abdominal tenderness. There is no right CVA tenderness, left CVA tenderness, guarding or rebound.   Musculoskeletal:         General: No tenderness or deformity. Normal range of motion.      Cervical back: Full passive range of motion without pain, normal range of motion and neck supple.      Thoracic back: Normal.      Right lower leg: No edema.      Left lower leg: No edema.   Skin:     General: Skin is warm and dry.      Coloration: Skin is not jaundiced.      Findings: No rash.   Neurological:      Mental Status: She is alert and oriented to person, place, and time.      Cranial Nerves: Cranial nerves are intact.      Sensory: Sensation is intact.      Motor: Motor function is intact.      Coordination: Coordination is intact.      Gait: Gait is intact.      Deep Tendon Reflexes: Reflexes are normal and symmetric.   Psychiatric:         Speech: She is noncommunicative.      Comments: MR, here with caregiver, rocking back and forth, random outbursts          Result Review  Data Reviewed:                   Assessment and Plan      Problem List Items Addressed This Visit        Mental Health    Bipolar affective disorder, current episode mixed (CMS/HCC) - Primary    Relevant Medications    ARIPiprazole (Abilify) 20 MG tablet    Mixed anxiety and depressive disorder    Relevant Medications    ARIPiprazole (Abilify) 20 MG tablet       Neuro    Autism spectrum disorder    Relevant Medications    ARIPiprazole (Abilify) 20 MG tablet      Other Visit Diagnoses     Intellectual disability         Relevant Medications    ARIPiprazole (Abilify) 20 MG tablet    Hair loss        Relevant Orders    TSH    Urinalysis With Culture If Indicated - Urine, Clean Catch    Vitamin B12    CBC w AUTO Differential    Comprehensive metabolic panel      Plan:   1. Increase Abilify to 20mg.   2. Will order labs for complaints of hair loss and I did note weight loss on exam today as well.    3. Tb risk assessment paperwork given in office today.         Follow Up   Return in about 1 month (around 10/9/2021).  Patient was given instructions and counseling regarding her condition or for health maintenance advice. Please see specific information pulled into the AVS if appropriate.

## 2021-09-09 NOTE — TELEPHONE ENCOUNTER
Pharmacy Name:  [facility]     Pharmacy representative name: DID NOT STATE   [phone]         Pharmacy representative phone number: [phone]    What medication are you calling in regards to: ABLIFY     What question does the pharmacy have: STATES PATIENT WOULD LIKE TO VERIFY THAT HER MEDICATION ABILIFY  HAS BEEN INCREASED TO 15 MG     Who is the provider that prescribed the medication: [facility]     Additional notes:

## 2021-09-10 RX ORDER — TOPIRAMATE 50 MG/1
50 TABLET, FILM COATED ORAL DAILY
Qty: 30 TABLET | Refills: 0 | Status: SHIPPED | OUTPATIENT
Start: 2021-09-10 | End: 2021-10-12

## 2021-09-10 RX ORDER — CHLORPROMAZINE HYDROCHLORIDE 25 MG/1
25 TABLET, FILM COATED ORAL 2 TIMES DAILY
Qty: 60 TABLET | Refills: 0 | Status: SHIPPED | OUTPATIENT
Start: 2021-09-10 | End: 2021-10-01

## 2021-09-10 RX ORDER — CHOLECALCIFEROL (VITAMIN D3) 125 MCG
5 CAPSULE ORAL NIGHTLY
Qty: 30 TABLET | Refills: 0 | Status: SHIPPED | OUTPATIENT
Start: 2021-09-10 | End: 2021-10-12

## 2021-09-10 RX ORDER — CITALOPRAM 20 MG/1
20 TABLET ORAL DAILY
Qty: 30 TABLET | Refills: 0 | Status: SHIPPED | OUTPATIENT
Start: 2021-09-10 | End: 2021-10-01

## 2021-09-10 RX ORDER — DIVALPROEX SODIUM 500 MG/1
500 TABLET, DELAYED RELEASE ORAL 3 TIMES DAILY
Qty: 90 TABLET | Refills: 0 | Status: SHIPPED | OUTPATIENT
Start: 2021-09-10 | End: 2021-10-12

## 2021-09-10 RX ORDER — ATORVASTATIN CALCIUM 10 MG/1
10 TABLET, FILM COATED ORAL DAILY
Qty: 30 TABLET | Refills: 0 | Status: SHIPPED | OUTPATIENT
Start: 2021-09-10 | End: 2021-10-01

## 2021-09-10 RX ORDER — CLONIDINE HYDROCHLORIDE 0.1 MG/1
0.1 TABLET ORAL NIGHTLY
Qty: 30 TABLET | Refills: 0 | Status: SHIPPED | OUTPATIENT
Start: 2021-09-10 | End: 2021-10-01

## 2021-09-10 RX ORDER — TRAZODONE HYDROCHLORIDE 150 MG/1
150 TABLET ORAL NIGHTLY
Qty: 30 TABLET | Refills: 0 | Status: SHIPPED | OUTPATIENT
Start: 2021-09-10 | End: 2021-10-12

## 2021-09-10 RX ORDER — QUETIAPINE FUMARATE 200 MG/1
200 TABLET, FILM COATED ORAL NIGHTLY
Qty: 30 TABLET | Refills: 0 | Status: SHIPPED | OUTPATIENT
Start: 2021-09-10 | End: 2021-10-12

## 2021-09-10 NOTE — TELEPHONE ENCOUNTER
Pt's pharmacy contacted office again in regards to pt's medication refills. Requested the 9 pended. Pt was last seen yesterday 9/9/21 and to f/u in one month. Will route the following to provider to review and advise if okay to refill as these were not sent at visit. Will route 30 day supply as pt is to f/u in 1 mo.

## 2021-09-17 DIAGNOSIS — F41.8 MIXED ANXIETY AND DEPRESSIVE DISORDER: ICD-10-CM

## 2021-09-17 RX ORDER — ALPRAZOLAM 0.5 MG/1
TABLET ORAL
Qty: 60 TABLET | Refills: 0 | OUTPATIENT
Start: 2021-09-17

## 2021-09-20 ENCOUNTER — TELEPHONE (OUTPATIENT)
Dept: FAMILY MEDICINE CLINIC | Facility: CLINIC | Age: 26
End: 2021-09-20

## 2021-09-20 NOTE — TELEPHONE ENCOUNTER
Guardian returned call and stated she is more aggressive and asked for scheduled xanax.  Discussed with Manuela and she stated we could not do a scheduled xanax but would lower the abilify dose and add referral to dr. Hendricks office.  Carmen v/u.

## 2021-09-20 NOTE — TELEPHONE ENCOUNTER
Caller: FRANKLIN WAGNER    Relationship to patient: Other    Best call back number: [phone]    Patient is needing: PATIENTS CAREGIVER CALLING IN. FRANKLIN HAS SOME MEDICATION CONCERNS THAT SHE WOULD LIKE TO DISCUSS.

## 2021-09-23 DIAGNOSIS — F31.60 BIPOLAR AFFECTIVE DISORDER, CURRENT EPISODE MIXED, CURRENT EPISODE SEVERITY UNSPECIFIED (HCC): ICD-10-CM

## 2021-09-23 DIAGNOSIS — F84.0 AUTISM SPECTRUM DISORDER: Primary | ICD-10-CM

## 2021-09-23 DIAGNOSIS — F41.8 MIXED ANXIETY AND DEPRESSIVE DISORDER: ICD-10-CM

## 2021-09-23 RX ORDER — ARIPIPRAZOLE 15 MG/1
15 TABLET ORAL DAILY
Qty: 30 TABLET | Refills: 1 | Status: SHIPPED | OUTPATIENT
Start: 2021-09-23 | End: 2021-10-12

## 2021-09-23 RX ORDER — ALPRAZOLAM 0.5 MG/1
TABLET ORAL
Qty: 60 TABLET | Refills: 0 | Status: SHIPPED | OUTPATIENT
Start: 2021-09-23 | End: 2021-11-17 | Stop reason: SDUPTHER

## 2021-10-01 ENCOUNTER — LAB (OUTPATIENT)
Dept: LAB | Facility: HOSPITAL | Age: 26
End: 2021-10-01

## 2021-10-01 DIAGNOSIS — L65.9 HAIR LOSS: ICD-10-CM

## 2021-10-01 DIAGNOSIS — F41.8 MIXED ANXIETY AND DEPRESSIVE DISORDER: ICD-10-CM

## 2021-10-01 DIAGNOSIS — E78.00 HYPERCHOLESTEROLEMIA: ICD-10-CM

## 2021-10-01 DIAGNOSIS — G47.00 INSOMNIA, UNSPECIFIED TYPE: ICD-10-CM

## 2021-10-01 DIAGNOSIS — L70.9 ACNE, UNSPECIFIED ACNE TYPE: ICD-10-CM

## 2021-10-01 DIAGNOSIS — F84.0 AUTISM SPECTRUM DISORDER: ICD-10-CM

## 2021-10-01 LAB
ALBUMIN SERPL-MCNC: 4.4 G/DL (ref 3.5–5)
ALBUMIN/GLOB SERPL: 1.3 G/DL (ref 1.1–2.5)
ALP SERPL-CCNC: 51 U/L (ref 24–120)
ALT SERPL W P-5'-P-CCNC: 22 U/L (ref 0–35)
ANION GAP SERPL CALCULATED.3IONS-SCNC: 8 MMOL/L (ref 4–13)
AST SERPL-CCNC: 25 U/L (ref 7–45)
AUTO MIXED CELLS #: 0.8 10*3/MM3 (ref 0.1–2.6)
AUTO MIXED CELLS %: 11.6 % (ref 0.1–24)
BILIRUB SERPL-MCNC: 0.2 MG/DL (ref 0.1–1)
BUN SERPL-MCNC: 15 MG/DL (ref 5–21)
BUN/CREAT SERPL: 17
CALCIUM SPEC-SCNC: 9.7 MG/DL (ref 8.4–10.4)
CHLORIDE SERPL-SCNC: 111 MMOL/L (ref 98–110)
CO2 SERPL-SCNC: 24 MMOL/L (ref 24–31)
CREAT SERPL-MCNC: 0.88 MG/DL (ref 0.5–1.4)
ERYTHROCYTE [DISTWIDTH] IN BLOOD BY AUTOMATED COUNT: 12.1 % (ref 12.3–15.4)
GFR SERPL CREATININE-BSD FRML MDRD: 78 ML/MIN/1.73
GLOBULIN UR ELPH-MCNC: 3.5 GM/DL
GLUCOSE SERPL-MCNC: 108 MG/DL (ref 70–100)
HCT VFR BLD AUTO: 38.6 % (ref 34–46.6)
HGB BLD-MCNC: 13.2 G/DL (ref 12–15.9)
LYMPHOCYTES # BLD AUTO: 2.7 10*3/MM3 (ref 0.7–3.1)
LYMPHOCYTES NFR BLD AUTO: 38.1 % (ref 19.6–45.3)
MCH RBC QN AUTO: 32.8 PG (ref 26.6–33)
MCHC RBC AUTO-ENTMCNC: 34.2 G/DL (ref 31.5–35.7)
MCV RBC AUTO: 95.8 FL (ref 79–97)
NEUTROPHILS NFR BLD AUTO: 3.5 10*3/MM3 (ref 1.7–7)
NEUTROPHILS NFR BLD AUTO: 50.3 % (ref 42.7–76)
PLATELET # BLD AUTO: 254 10*3/MM3 (ref 140–450)
PMV BLD AUTO: 10.4 FL (ref 6–12)
POTASSIUM SERPL-SCNC: 4.3 MMOL/L (ref 3.5–5.3)
PROT SERPL-MCNC: 7.9 G/DL (ref 6.3–8.7)
RBC # BLD AUTO: 4.03 10*6/MM3 (ref 3.77–5.28)
SODIUM SERPL-SCNC: 143 MMOL/L (ref 135–145)
WBC # BLD AUTO: 7 10*3/MM3 (ref 3.4–10.8)

## 2021-10-01 PROCEDURE — 84443 ASSAY THYROID STIM HORMONE: CPT

## 2021-10-01 PROCEDURE — 80053 COMPREHEN METABOLIC PANEL: CPT

## 2021-10-01 PROCEDURE — 82607 VITAMIN B-12: CPT

## 2021-10-01 PROCEDURE — 36415 COLL VENOUS BLD VENIPUNCTURE: CPT

## 2021-10-01 PROCEDURE — 85025 COMPLETE CBC W/AUTO DIFF WBC: CPT

## 2021-10-01 RX ORDER — ATORVASTATIN CALCIUM 10 MG/1
TABLET, FILM COATED ORAL
Qty: 30 TABLET | Refills: 0 | Status: SHIPPED | OUTPATIENT
Start: 2021-10-01 | End: 2021-11-19

## 2021-10-01 RX ORDER — CLONIDINE HYDROCHLORIDE 0.1 MG/1
TABLET ORAL
Qty: 45 TABLET | Refills: 0 | Status: SHIPPED | OUTPATIENT
Start: 2021-10-01 | End: 2021-11-19

## 2021-10-01 RX ORDER — CITALOPRAM 20 MG/1
TABLET ORAL
Qty: 30 TABLET | Refills: 0 | Status: SHIPPED | OUTPATIENT
Start: 2021-10-01 | End: 2021-11-19

## 2021-10-01 RX ORDER — CHLORPROMAZINE HYDROCHLORIDE 25 MG/1
TABLET, FILM COATED ORAL
Qty: 60 TABLET | Refills: 0 | Status: SHIPPED | OUTPATIENT
Start: 2021-10-01 | End: 2021-11-19

## 2021-10-01 RX ORDER — CLINDAMYCIN AND BENZOYL PEROXIDE 10; 50 MG/G; MG/G
GEL TOPICAL
Qty: 50 G | Refills: 1 | Status: SHIPPED | OUTPATIENT
Start: 2021-10-01 | End: 2021-12-17

## 2021-10-02 LAB
TSH SERPL DL<=0.05 MIU/L-ACNC: 1.37 UIU/ML (ref 0.27–4.2)
VIT B12 BLD-MCNC: 484 PG/ML (ref 211–946)

## 2021-10-06 DIAGNOSIS — K58.9 IRRITABLE BOWEL SYNDROME, UNSPECIFIED TYPE: ICD-10-CM

## 2021-10-06 DIAGNOSIS — E66.3 OVERWEIGHT: ICD-10-CM

## 2021-10-06 DIAGNOSIS — G47.00 INSOMNIA, UNSPECIFIED TYPE: ICD-10-CM

## 2021-10-06 DIAGNOSIS — F84.0 AUTISM SPECTRUM DISORDER: ICD-10-CM

## 2021-10-06 DIAGNOSIS — F31.60 BIPOLAR AFFECTIVE DISORDER, CURRENT EPISODE MIXED, CURRENT EPISODE SEVERITY UNSPECIFIED (HCC): ICD-10-CM

## 2021-10-07 RX ORDER — DIVALPROEX SODIUM 500 MG/1
TABLET, DELAYED RELEASE ORAL
Qty: 90 TABLET | Refills: 0 | OUTPATIENT
Start: 2021-10-07

## 2021-10-07 RX ORDER — ARIPIPRAZOLE 15 MG/1
TABLET ORAL
Qty: 30 TABLET | Refills: 1 | OUTPATIENT
Start: 2021-10-07

## 2021-10-07 RX ORDER — QUETIAPINE FUMARATE 200 MG/1
TABLET, FILM COATED ORAL
Qty: 30 TABLET | Refills: 0 | OUTPATIENT
Start: 2021-10-07

## 2021-10-07 RX ORDER — TOPIRAMATE 50 MG/1
TABLET, FILM COATED ORAL
Qty: 30 TABLET | Refills: 0 | OUTPATIENT
Start: 2021-10-07

## 2021-10-07 RX ORDER — DICYCLOMINE HCL 20 MG
TABLET ORAL
Qty: 120 TABLET | Refills: 0 | OUTPATIENT
Start: 2021-10-07

## 2021-10-07 RX ORDER — CHOLECALCIFEROL (VITAMIN D3) 125 MCG
CAPSULE ORAL
Qty: 30 TABLET | Refills: 0 | OUTPATIENT
Start: 2021-10-07

## 2021-10-12 DIAGNOSIS — E66.3 OVERWEIGHT: ICD-10-CM

## 2021-10-12 DIAGNOSIS — G47.00 INSOMNIA, UNSPECIFIED TYPE: ICD-10-CM

## 2021-10-12 DIAGNOSIS — F84.0 AUTISM SPECTRUM DISORDER: ICD-10-CM

## 2021-10-12 DIAGNOSIS — K58.9 IRRITABLE BOWEL SYNDROME, UNSPECIFIED TYPE: ICD-10-CM

## 2021-10-12 DIAGNOSIS — F31.60 BIPOLAR AFFECTIVE DISORDER, CURRENT EPISODE MIXED, CURRENT EPISODE SEVERITY UNSPECIFIED (HCC): ICD-10-CM

## 2021-10-12 RX ORDER — CHOLECALCIFEROL (VITAMIN D3) 125 MCG
CAPSULE ORAL
Qty: 30 TABLET | Refills: 2 | Status: SHIPPED | OUTPATIENT
Start: 2021-10-12 | End: 2022-01-14

## 2021-10-12 RX ORDER — QUETIAPINE FUMARATE 200 MG/1
TABLET, FILM COATED ORAL
Qty: 30 TABLET | Refills: 2 | Status: SHIPPED | OUTPATIENT
Start: 2021-10-12 | End: 2022-01-14

## 2021-10-12 RX ORDER — TOPIRAMATE 50 MG/1
TABLET, FILM COATED ORAL
Qty: 30 TABLET | Refills: 2 | Status: SHIPPED | OUTPATIENT
Start: 2021-10-12 | End: 2022-01-14

## 2021-10-12 RX ORDER — TRAZODONE HYDROCHLORIDE 150 MG/1
TABLET ORAL
Qty: 30 TABLET | Refills: 2 | Status: SHIPPED | OUTPATIENT
Start: 2021-10-12 | End: 2022-01-14

## 2021-10-12 RX ORDER — DIVALPROEX SODIUM 500 MG/1
TABLET, DELAYED RELEASE ORAL
Qty: 90 TABLET | Refills: 2 | Status: SHIPPED | OUTPATIENT
Start: 2021-10-12 | End: 2022-01-14

## 2021-10-12 RX ORDER — ARIPIPRAZOLE 15 MG/1
TABLET ORAL
Qty: 30 TABLET | Refills: 2 | Status: SHIPPED | OUTPATIENT
Start: 2021-10-12 | End: 2022-01-14

## 2021-10-12 RX ORDER — DICYCLOMINE HCL 20 MG
TABLET ORAL
Qty: 120 TABLET | Refills: 2 | Status: SHIPPED | OUTPATIENT
Start: 2021-10-12 | End: 2022-01-14

## 2021-10-15 ENCOUNTER — TELEPHONE (OUTPATIENT)
Dept: FAMILY MEDICINE CLINIC | Facility: CLINIC | Age: 26
End: 2021-10-15

## 2021-10-15 NOTE — TELEPHONE ENCOUNTER
Caller: FRANKLIN WAGNER     Relationship: CAREGIVER    Best call back number: [phone]    Caller requesting test results: CAREGIVER    What test was performed: LABS  When was the test performed: 10/01/2021  Where was the test performed: [facility]

## 2021-10-15 NOTE — TELEPHONE ENCOUNTER
Contacted Jael and discussed normal lab results.  She states patient has significant hair loss and she is concerned.  Telehealth visit scheduled for next week to discuss.

## 2021-10-19 ENCOUNTER — TELEMEDICINE (OUTPATIENT)
Dept: FAMILY MEDICINE CLINIC | Facility: CLINIC | Age: 26
End: 2021-10-19

## 2021-10-19 VITALS — HEIGHT: 65 IN | WEIGHT: 169 LBS | BODY MASS INDEX: 28.16 KG/M2

## 2021-10-19 DIAGNOSIS — F31.60 BIPOLAR AFFECTIVE DISORDER, CURRENT EPISODE MIXED, CURRENT EPISODE SEVERITY UNSPECIFIED (HCC): ICD-10-CM

## 2021-10-19 DIAGNOSIS — F84.0 AUTISM: ICD-10-CM

## 2021-10-19 DIAGNOSIS — L65.9 HAIR LOSS: Primary | ICD-10-CM

## 2021-10-19 DIAGNOSIS — F79 INTELLECTUAL DISABILITY: ICD-10-CM

## 2021-10-19 PROCEDURE — 99214 OFFICE O/P EST MOD 30 MIN: CPT | Performed by: NURSE PRACTITIONER

## 2021-10-19 RX ORDER — SPIRONOLACTONE 50 MG/1
TABLET, FILM COATED ORAL
Qty: 30 TABLET | Refills: 2 | Status: SHIPPED | OUTPATIENT
Start: 2021-10-19 | End: 2021-12-17

## 2021-10-19 NOTE — PROGRESS NOTES
"This was an audio and video enabled telemedicine encounter. Patient verbally consented to visit. Patient was located at Home and I was located at [facility]  location.     Chief Complaint  Alopecia (Continues to lose hair and caregiver wants to discuss options. )    Subjective    History of Present Illness      Patient presents to [facility] for   Caregiver concerned that she continues to lose hair. She had labs here recently that were normal so they want to discuss options.        Review of Systems   HENT:        Hair loss        I have reviewed and agree with the HPI and ROS information as above.  HALI Urena     Objective   Vital Signs:   Ht 165.1 cm (65\")   Wt 76.7 kg (169 lb)   BMI 28.12 kg/m²       Physical Exam  Constitutional:       Appearance: She is well-developed and overweight.   HENT:      Head: Normocephalic and atraumatic.      Nose: No congestion.      Mouth/Throat:      Lips: Pink. No lesions.   Eyes:      General: Lids are normal. Vision grossly intact.      Conjunctiva/sclera: Conjunctivae normal.      Right eye: Right conjunctiva is not injected.      Left eye: Left conjunctiva is not injected.   Pulmonary:      Effort: Pulmonary effort is normal.   Musculoskeletal:         General: Normal range of motion.      Cervical back: Full passive range of motion without pain, normal range of motion and neck supple.   Skin:     General: Skin is warm and dry.   Neurological:      Mental Status: She is alert and oriented to person, place, and time.      Motor: Motor function is intact.   Psychiatric:         Cognition and Memory: Cognition is impaired.         Judgment: Judgment is inappropriate.      Comments: Caregiver present as historian           Result Review  Data Reviewed:                TSH (10/01/2021 14:18)  Vitamin B12 (10/01/2021 14:18)  CBC w AUTO Differential (10/01/2021 14:18)  Comprehensive metabolic panel (10/01/2021 14:18)     "   Assessment and Plan      Problem List Items Addressed This Visit        Mental Health    Bipolar affective disorder, current episode mixed (HCC)       Neuro    Autism      Other Visit Diagnoses     Hair loss    -  Primary    Relevant Medications    spironolactone (Aldactone) 50 MG tablet    Intellectual disability          Plan:   1. Recent labs re-reviewed and are satisfactory.  2. Start a skin/hair/nails vitamin otc.   3. Trial spironolactone with lab monitoring for 3 months. Cbc, cmp. Medication counseling done.   4. Psych consult pending.         Follow Up   Return in about 1 month (around 11/19/2021).  Patient was given instructions and counseling regarding her condition or for health maintenance advice. Please see specific information pulled into the AVS if appropriate.

## 2021-10-26 ENCOUNTER — LAB (OUTPATIENT)
Dept: LAB | Facility: HOSPITAL | Age: 26
End: 2021-10-26

## 2021-10-26 ENCOUNTER — OFFICE VISIT (OUTPATIENT)
Dept: FAMILY MEDICINE CLINIC | Facility: CLINIC | Age: 26
End: 2021-10-26

## 2021-10-26 ENCOUNTER — TELEPHONE (OUTPATIENT)
Dept: FAMILY MEDICINE CLINIC | Facility: CLINIC | Age: 26
End: 2021-10-26

## 2021-10-26 VITALS
SYSTOLIC BLOOD PRESSURE: 124 MMHG | RESPIRATION RATE: 20 BRPM | TEMPERATURE: 98.7 F | DIASTOLIC BLOOD PRESSURE: 86 MMHG | WEIGHT: 166 LBS | BODY MASS INDEX: 27.66 KG/M2 | HEIGHT: 65 IN | HEART RATE: 95 BPM

## 2021-10-26 DIAGNOSIS — J34.89 SINUS PRESSURE: ICD-10-CM

## 2021-10-26 DIAGNOSIS — H92.09 EARACHE: ICD-10-CM

## 2021-10-26 DIAGNOSIS — F84.0 AUTISM SPECTRUM DISORDER: ICD-10-CM

## 2021-10-26 DIAGNOSIS — F79 INTELLECTUAL DISABILITY: ICD-10-CM

## 2021-10-26 DIAGNOSIS — J34.89 SINUS PRESSURE: Primary | ICD-10-CM

## 2021-10-26 LAB — SARS-COV-2 RNA PNL SPEC NAA+PROBE: NOT DETECTED

## 2021-10-26 PROCEDURE — 87635 SARS-COV-2 COVID-19 AMP PRB: CPT

## 2021-10-26 PROCEDURE — 99213 OFFICE O/P EST LOW 20 MIN: CPT | Performed by: NURSE PRACTITIONER

## 2021-10-26 RX ORDER — CEFUROXIME AXETIL 500 MG/1
500 TABLET ORAL 2 TIMES DAILY
Qty: 20 TABLET | Refills: 0 | Status: SHIPPED | OUTPATIENT
Start: 2021-10-26 | End: 2021-11-05

## 2021-10-26 NOTE — TELEPHONE ENCOUNTER
----- Message from HALI Urena sent at 10/26/2021  3:42 PM CDT -----  Please let pt know results: negative for covid     Contacted pt's caregiver, verified pt name and . Informed of above. Pt vu of all.

## 2021-10-26 NOTE — PROGRESS NOTES
"Chief Complaint  Earache (c/o bilateral ear pain x 2 days) and Sore Throat    Subjective    History of Present Illness      Patient presents to [facility] for   Pt caregiver states that she thinks she has a sore throat and an earache because she has been holding her ears and pushing on her forehead and acting more tired lately.   No fever. Slightly hoarse this morning.     Earache   There is pain in both ears. This is a new problem. The current episode started in the past 7 days. The problem occurs constantly. The problem has been unchanged. There has been no fever. The pain is mild. Associated symptoms include a sore throat.        Review of Systems   Constitutional: Negative for fever.   HENT: Positive for ear pain and sore throat.    All other systems reviewed and are negative.      I have reviewed and agree with the HPI and ROS information as above.  Manuela Amaya, HALI     Objective   Vital Signs:   /86   Pulse 95   Temp 98.7 °F (37.1 °C)   Resp 20   Ht 165.1 cm (65\")   Wt 75.3 kg (166 lb)   BMI 27.62 kg/m²       Physical Exam  Constitutional:       Appearance: She is well-developed and overweight.      Comments: Rocking back and forth, tired appearing    HENT:      Head: Normocephalic and atraumatic.      Right Ear: Tympanic membrane, ear canal and external ear normal.      Left Ear: Tympanic membrane, ear canal and external ear normal.      Nose: No septal deviation, nasal tenderness or congestion.      Right Sinus: Maxillary sinus tenderness present.      Left Sinus: Maxillary sinus tenderness present.      Mouth/Throat:      Lips: Pink. No lesions.      Mouth: Mucous membranes are moist. No oral lesions.      Dentition: Normal dentition.      Pharynx: Oropharynx is clear. No pharyngeal swelling, oropharyngeal exudate or posterior oropharyngeal erythema.   Eyes:      General: Lids are normal. Vision grossly intact. No scleral icterus.        Right eye: No " discharge.         Left eye: No discharge.      Extraocular Movements: Extraocular movements intact.      Conjunctiva/sclera: Conjunctivae normal.      Right eye: Right conjunctiva is not injected.      Left eye: Left conjunctiva is not injected.      Pupils: Pupils are equal, round, and reactive to light.   Neck:      Thyroid: No thyroid mass.      Trachea: Trachea normal.   Cardiovascular:      Rate and Rhythm: Normal rate and regular rhythm.      Heart sounds: Normal heart sounds. No murmur heard.  No gallop.    Pulmonary:      Effort: Pulmonary effort is normal.      Breath sounds: Normal breath sounds and air entry. No wheezing, rhonchi or rales.   Abdominal:      General: There is no distension.      Palpations: Abdomen is soft. There is no mass.      Tenderness: There is no abdominal tenderness. There is no right CVA tenderness, left CVA tenderness, guarding or rebound.   Musculoskeletal:         General: No tenderness or deformity. Normal range of motion.      Cervical back: Full passive range of motion without pain, normal range of motion and neck supple.      Thoracic back: Normal.      Right lower leg: No edema.      Left lower leg: No edema.   Skin:     General: Skin is warm and dry.      Coloration: Skin is not jaundiced.      Findings: No rash.   Neurological:      Mental Status: She is alert.      Motor: Motor function is intact.      Coordination: Coordination is intact.      Gait: Gait is intact.   Psychiatric:      Comments: MR, caregiver assists with interpreting her outbursts and movements. She is holding her temples and bridge of her nose, then moves to holding her ears.           Result Review  Data Reviewed:                   Assessment and Plan      Problem List Items Addressed This Visit        Neuro    Autism spectrum disorder      Other Visit Diagnoses     Sinus pressure    -  Primary    Relevant Medications    cefuroxime (CEFTIN) 500 MG tablet    Other Relevant Orders    COVID-19,[facility]  IN-HOUSE,Nasal Swab No Transport Media 3-4 HR TAT - Swab, Nasal Cavity (Completed)    Earache        Relevant Medications    cefuroxime (CEFTIN) 500 MG tablet    Other Relevant Orders    COVID-19,[facility] IN-HOUSE,Nasal Swab No Transport Media 3-4 HR TAT - Swab, Nasal Cavity (Completed)    Intellectual disability          Plan: Difficult to fully assess patient as she is MR, but caregiver agrees that she feels that this may be sinus infection or URI related.  Patient is holding her nose ears and maxillary sinus region in office today.  Covid swab was negative.  We will treat with Ceftin at this time.  If symptoms do not improve can do further work-up with blood work.        Follow Up   Return if symptoms worsen or fail to improve.  Patient was given instructions and counseling regarding her condition or for health maintenance advice. Please see specific information pulled into the AVS if appropriate.

## 2021-11-08 ENCOUNTER — OFFICE VISIT (OUTPATIENT)
Dept: FAMILY MEDICINE CLINIC | Facility: CLINIC | Age: 26
End: 2021-11-08

## 2021-11-08 VITALS — HEIGHT: 65 IN | WEIGHT: 164 LBS | BODY MASS INDEX: 27.32 KG/M2 | RESPIRATION RATE: 20 BRPM | TEMPERATURE: 98.7 F

## 2021-11-08 DIAGNOSIS — Z00.00 WELL ADULT EXAM: Primary | ICD-10-CM

## 2021-11-08 DIAGNOSIS — F84.0 AUTISM SPECTRUM DISORDER: ICD-10-CM

## 2021-11-08 PROBLEM — F34.81 DISRUPTIVE MOOD DYSREGULATION DISORDER (HCC): Status: ACTIVE | Noted: 2021-11-08

## 2021-11-08 PROCEDURE — 99395 PREV VISIT EST AGE 18-39: CPT | Performed by: PEDIATRICS

## 2021-11-08 NOTE — ASSESSMENT & PLAN NOTE
Psychological condition is unchanged.  Continue current treatment regimen.  Psychological condition  will be reassessed in 3 months     meds and lab were reviewed and are current.

## 2021-11-08 NOTE — PROGRESS NOTES
"Chief Complaint  Annual Exam (annual physical. no c/o)    Subjective    History of Present Illness      Patient presents to [facility] for   Annual physical. Doing ok        Review of Systems   All other systems reviewed and are negative.      I have reviewed and agree with the HPI information as above.  Jeremias Layton MD     Objective   Vital Signs:   Temp 98.7 °F (37.1 °C)   Resp 20   Ht 165.1 cm (65\")   Wt 74.4 kg (164 lb)   BMI 27.29 kg/m²       Physical Exam  Constitutional:       Appearance: She is well-developed and overweight.   HENT:      Head: Normocephalic and atraumatic.      Nose: No congestion.      Mouth/Throat:      Lips: Pink. No lesions.   Eyes:      General: Lids are normal. Vision grossly intact.      Conjunctiva/sclera: Conjunctivae normal.      Right eye: Right conjunctiva is not injected.      Left eye: Left conjunctiva is not injected.   Pulmonary:      Effort: Pulmonary effort is normal.   Musculoskeletal:         General: Normal range of motion.      Cervical back: Full passive range of motion without pain, normal range of motion and neck supple.   Skin:     General: Skin is warm and dry.   Neurological:      Mental Status: She is alert and oriented to person, place, and time.      Motor: Motor function is intact.   Psychiatric:         Cognition and Memory: Cognition is impaired.         Judgment: Judgment is inappropriate.      Comments: Caregiver present as historian           Result Review  Data Reviewed:              Comprehensive metabolic panel (10/01/2021 14:18)  CBC w AUTO Differential (10/01/2021 14:18)  Vitamin B12 (10/01/2021 14:18)  TSH (10/01/2021 14:18)  Urinalysis With Culture If Indicated - Urine, Clean Catch (08/20/2021 14:50)  Hemoglobin A1c (01/14/2021 12:57)       Assessment and Plan      Problem List Items Addressed This Visit        Health Encounters    Well adult exam - Primary    Current Assessment & Plan     No changes or concerns " with diet/ issues with safety /   Mood is the same.   Here with caretaker who expresses no concerns with her physical well being.  Lab is current and was reviewed.  Weight was down slightly which was praised.   Forms completed for her care facility            Neuro    Autism spectrum disorder    Current Assessment & Plan     Psychological condition is unchanged.  Continue current treatment regimen.  Psychological condition  will be reassessed in 3 months     meds and lab were reviewed and are current.                   Follow Up   Return in about 3 months (around 2/8/2022) for Recheck.  Patient was given instructions and counseling regarding her condition or for health maintenance advice. Please see specific information pulled into the AVS if appropriate.

## 2021-11-08 NOTE — ASSESSMENT & PLAN NOTE
No changes or concerns with diet/ issues with safety /   Mood is the same.   Here with caretaker who expresses no concerns with her physical well being.  Lab is current and was reviewed.  Weight was down slightly which was praised.   Forms completed for her care facility

## 2021-11-09 DIAGNOSIS — F41.8 MIXED ANXIETY AND DEPRESSIVE DISORDER: ICD-10-CM

## 2021-11-09 DIAGNOSIS — G47.00 INSOMNIA, UNSPECIFIED TYPE: ICD-10-CM

## 2021-11-09 DIAGNOSIS — F84.0 AUTISM SPECTRUM DISORDER: ICD-10-CM

## 2021-11-09 DIAGNOSIS — E78.00 HYPERCHOLESTEROLEMIA: ICD-10-CM

## 2021-11-09 RX ORDER — CITALOPRAM 20 MG/1
TABLET ORAL
Qty: 30 TABLET | Refills: 0 | OUTPATIENT
Start: 2021-11-09

## 2021-11-09 RX ORDER — ATORVASTATIN CALCIUM 10 MG/1
TABLET, FILM COATED ORAL
Qty: 30 TABLET | Refills: 0 | OUTPATIENT
Start: 2021-11-09

## 2021-11-09 RX ORDER — CLONIDINE HYDROCHLORIDE 0.1 MG/1
TABLET ORAL
Qty: 45 TABLET | Refills: 0 | OUTPATIENT
Start: 2021-11-09

## 2021-11-09 RX ORDER — CHLORPROMAZINE HYDROCHLORIDE 25 MG/1
TABLET, FILM COATED ORAL
Qty: 60 TABLET | Refills: 0 | OUTPATIENT
Start: 2021-11-09

## 2021-11-10 DIAGNOSIS — F41.8 MIXED ANXIETY AND DEPRESSIVE DISORDER: ICD-10-CM

## 2021-11-10 DIAGNOSIS — F84.0 AUTISM SPECTRUM DISORDER: ICD-10-CM

## 2021-11-10 DIAGNOSIS — E78.00 HYPERCHOLESTEROLEMIA: ICD-10-CM

## 2021-11-10 DIAGNOSIS — G47.00 INSOMNIA, UNSPECIFIED TYPE: ICD-10-CM

## 2021-11-10 RX ORDER — CHLORPROMAZINE HYDROCHLORIDE 25 MG/1
TABLET, FILM COATED ORAL
Qty: 60 TABLET | Refills: 0 | OUTPATIENT
Start: 2021-11-10

## 2021-11-10 RX ORDER — CLONIDINE HYDROCHLORIDE 0.1 MG/1
TABLET ORAL
Qty: 45 TABLET | Refills: 0 | OUTPATIENT
Start: 2021-11-10

## 2021-11-10 RX ORDER — CITALOPRAM 20 MG/1
TABLET ORAL
Qty: 30 TABLET | Refills: 0 | OUTPATIENT
Start: 2021-11-10

## 2021-11-10 RX ORDER — ATORVASTATIN CALCIUM 10 MG/1
TABLET, FILM COATED ORAL
Qty: 30 TABLET | Refills: 0 | OUTPATIENT
Start: 2021-11-10

## 2021-11-17 ENCOUNTER — OFFICE VISIT (OUTPATIENT)
Dept: FAMILY MEDICINE CLINIC | Facility: CLINIC | Age: 26
End: 2021-11-17

## 2021-11-17 VITALS
WEIGHT: 161 LBS | DIASTOLIC BLOOD PRESSURE: 83 MMHG | SYSTOLIC BLOOD PRESSURE: 135 MMHG | HEART RATE: 123 BPM | HEIGHT: 65 IN | RESPIRATION RATE: 20 BRPM | BODY MASS INDEX: 26.82 KG/M2 | TEMPERATURE: 96 F

## 2021-11-17 DIAGNOSIS — F84.0 AUTISM SPECTRUM DISORDER: Primary | ICD-10-CM

## 2021-11-17 DIAGNOSIS — F41.8 MIXED ANXIETY AND DEPRESSIVE DISORDER: ICD-10-CM

## 2021-11-17 PROCEDURE — 99214 OFFICE O/P EST MOD 30 MIN: CPT | Performed by: PEDIATRICS

## 2021-11-17 RX ORDER — ALPRAZOLAM 1 MG/1
1 TABLET ORAL 3 TIMES DAILY PRN
Qty: 30 TABLET | Refills: 0 | Status: SHIPPED | OUTPATIENT
Start: 2021-11-17 | End: 2022-04-01 | Stop reason: SDUPTHER

## 2021-11-17 NOTE — ASSESSMENT & PLAN NOTE
Psychological condition is worsening.  Medication changes per orders.  Psychological condition  will be reassessed in 4 weeks.    I feel it is time to look at further options in other safer options in her care.  i'm very concerned with her aggression behavior and the ability to manage her.  She tries very hard to bite hit and is difficult to control these behaviors.
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
## 2021-11-18 DIAGNOSIS — F84.0 AUTISM SPECTRUM DISORDER: ICD-10-CM

## 2021-11-18 DIAGNOSIS — E78.00 HYPERCHOLESTEROLEMIA: ICD-10-CM

## 2021-11-18 DIAGNOSIS — G47.00 INSOMNIA, UNSPECIFIED TYPE: ICD-10-CM

## 2021-11-18 DIAGNOSIS — F41.8 MIXED ANXIETY AND DEPRESSIVE DISORDER: ICD-10-CM

## 2021-11-19 RX ORDER — CITALOPRAM 20 MG/1
TABLET ORAL
Qty: 30 TABLET | Refills: 0 | Status: SHIPPED | OUTPATIENT
Start: 2021-11-19 | End: 2021-12-17

## 2021-11-19 RX ORDER — ATORVASTATIN CALCIUM 10 MG/1
TABLET, FILM COATED ORAL
Qty: 30 TABLET | Refills: 0 | Status: SHIPPED | OUTPATIENT
Start: 2021-11-19 | End: 2021-12-17

## 2021-11-19 RX ORDER — CLONIDINE HYDROCHLORIDE 0.1 MG/1
TABLET ORAL
Qty: 45 TABLET | Refills: 0 | Status: SHIPPED | OUTPATIENT
Start: 2021-11-19 | End: 2021-12-17

## 2021-11-19 RX ORDER — CHLORPROMAZINE HYDROCHLORIDE 25 MG/1
TABLET, FILM COATED ORAL
Qty: 60 TABLET | Refills: 0 | Status: SHIPPED | OUTPATIENT
Start: 2021-11-19 | End: 2021-12-17

## 2021-12-17 DIAGNOSIS — G47.00 INSOMNIA, UNSPECIFIED TYPE: ICD-10-CM

## 2021-12-17 DIAGNOSIS — E78.00 HYPERCHOLESTEROLEMIA: ICD-10-CM

## 2021-12-17 DIAGNOSIS — F84.0 AUTISM SPECTRUM DISORDER: ICD-10-CM

## 2021-12-17 DIAGNOSIS — L70.9 ACNE, UNSPECIFIED ACNE TYPE: ICD-10-CM

## 2021-12-17 DIAGNOSIS — E55.9 VITAMIN D DEFICIENCY, UNSPECIFIED: ICD-10-CM

## 2021-12-17 DIAGNOSIS — F41.8 MIXED ANXIETY AND DEPRESSIVE DISORDER: ICD-10-CM

## 2021-12-17 DIAGNOSIS — L65.9 HAIR LOSS: ICD-10-CM

## 2021-12-17 DIAGNOSIS — K58.9 IRRITABLE BOWEL SYNDROME, UNSPECIFIED TYPE: ICD-10-CM

## 2021-12-17 RX ORDER — CITALOPRAM 20 MG/1
TABLET ORAL
Qty: 30 TABLET | Refills: 0 | Status: SHIPPED | OUTPATIENT
Start: 2021-12-17 | End: 2022-01-14

## 2021-12-17 RX ORDER — CLONIDINE HYDROCHLORIDE 0.1 MG/1
TABLET ORAL
Qty: 45 TABLET | Refills: 0 | Status: SHIPPED | OUTPATIENT
Start: 2021-12-17 | End: 2022-01-14

## 2021-12-17 RX ORDER — MIRTAZAPINE 15 MG/1
TABLET, FILM COATED ORAL
Qty: 30 TABLET | Refills: 5 | Status: SHIPPED | OUTPATIENT
Start: 2021-12-17

## 2021-12-17 RX ORDER — SPIRONOLACTONE 50 MG/1
TABLET, FILM COATED ORAL
Qty: 30 TABLET | Refills: 2 | Status: SHIPPED | OUTPATIENT
Start: 2021-12-17 | End: 2022-02-24 | Stop reason: SDUPTHER

## 2021-12-17 RX ORDER — ACETAMINOPHEN 160 MG
TABLET,DISINTEGRATING ORAL
Qty: 30 CAPSULE | Refills: 11 | Status: SHIPPED | OUTPATIENT
Start: 2021-12-17

## 2021-12-17 RX ORDER — CLINDAMYCIN AND BENZOYL PEROXIDE 10; 50 MG/G; MG/G
GEL TOPICAL
Qty: 50 G | Refills: 0 | Status: SHIPPED | OUTPATIENT
Start: 2021-12-17 | End: 2022-01-18

## 2021-12-17 RX ORDER — ATORVASTATIN CALCIUM 10 MG/1
TABLET, FILM COATED ORAL
Qty: 30 TABLET | Refills: 0 | Status: SHIPPED | OUTPATIENT
Start: 2021-12-17 | End: 2022-01-14

## 2021-12-17 RX ORDER — PSYLLIUM HUSK 0.52 G/1
CAPSULE ORAL
Qty: 30 CAPSULE | Refills: 5 | Status: SHIPPED | OUTPATIENT
Start: 2021-12-17

## 2021-12-17 RX ORDER — CHLORPROMAZINE HYDROCHLORIDE 25 MG/1
TABLET, FILM COATED ORAL
Qty: 60 TABLET | Refills: 0 | Status: SHIPPED | OUTPATIENT
Start: 2021-12-17 | End: 2022-01-14

## 2022-01-10 DIAGNOSIS — F41.8 MIXED ANXIETY AND DEPRESSIVE DISORDER: ICD-10-CM

## 2022-01-10 DIAGNOSIS — F31.60 BIPOLAR AFFECTIVE DISORDER, CURRENT EPISODE MIXED, CURRENT EPISODE SEVERITY UNSPECIFIED: ICD-10-CM

## 2022-01-10 DIAGNOSIS — K58.9 IRRITABLE BOWEL SYNDROME, UNSPECIFIED TYPE: ICD-10-CM

## 2022-01-10 DIAGNOSIS — G47.00 INSOMNIA, UNSPECIFIED TYPE: ICD-10-CM

## 2022-01-10 DIAGNOSIS — E78.00 HYPERCHOLESTEROLEMIA: ICD-10-CM

## 2022-01-10 DIAGNOSIS — F84.0 AUTISM SPECTRUM DISORDER: ICD-10-CM

## 2022-01-10 DIAGNOSIS — R52 PAIN: ICD-10-CM

## 2022-01-10 DIAGNOSIS — E66.3 OVERWEIGHT: ICD-10-CM

## 2022-01-11 RX ORDER — ACETAMINOPHEN 325 MG/1
TABLET ORAL
Qty: 30 TABLET | Refills: 3 | Status: SHIPPED | OUTPATIENT
Start: 2022-01-11

## 2022-01-11 NOTE — TELEPHONE ENCOUNTER
Requested Prescriptions     Pending Prescriptions Disp Refills   • acetaminophen (TYLENOL) 325 MG tablet [Pharmacy Med Name: acetaminophen 325 mg tablet] 30 tablet 3     Sig: GIVE 2 Tablet BY MOUTH EVERY 4 HOURS AS NEEDED FOR PAIN OR FEVER OVER 100.5 DO NOT EXCEED 6 CAPLETS IN 24 HOURS, UNLESS DIRECTED BY A DOCTOR     Okay to refill?

## 2022-01-11 NOTE — TELEPHONE ENCOUNTER
Requested Prescriptions     Pending Prescriptions Disp Refills   • atorvastatin (LIPITOR) 10 MG tablet [Pharmacy Med Name: atorvastatin 10 mg tablet] 30 tablet 0     Sig: GIVE ONE Tablet BY MOUTH DAILY   • chlorproMAZINE (THORAZINE) 25 MG tablet [Pharmacy Med Name: chlorpromazine 25 mg tablet] 60 tablet 0     Sig: GIVE  ONE Tablet BY MOUTH TWICE A DAY   • cloNIDine (CATAPRES) 0.1 MG tablet [Pharmacy Med Name: clonidine HCl 0.1 mg tablet] 45 tablet 0     Sig: GIVE 1 AND 1/2 Tablets BY MOUTH AT BEDTIME   • citalopram (CeleXA) 20 MG tablet [Pharmacy Med Name: citalopram 20 mg tablet] 30 tablet 0     Sig: GIVE ONE Tablet BY MOUTH DAILY     Okay to refill?

## 2022-01-11 NOTE — TELEPHONE ENCOUNTER
Requested Prescriptions     Pending Prescriptions Disp Refills   • dicyclomine (BENTYL) 20 MG tablet [Pharmacy Med Name: dicyclomine 20 mg tablet] 120 tablet 2     Sig: GIVE 1 Tablet BY MOUTH FOUR TIMES DAILY   • divalproex (DEPAKOTE) 500 MG DR tablet [Pharmacy Med Name: divalproex 500 mg tablet,delayed release] 90 tablet 2     Sig: GIVE 1 Tablet BY MOUTH THREE TIMES A DAY   • ARIPiprazole (ABILIFY) 15 MG tablet [Pharmacy Med Name: aripiprazole 15 mg tablet] 30 tablet 2     Sig: GIVE ONE Tablet BY MOUTH DAILY   • melatonin 5 MG tablet tablet [Pharmacy Med Name: melatonin 5 mg tablet] 30 tablet 2     Sig: GIVE 1 Tablet BY MOUTH AT BEDTIME   • topiramate (TOPAMAX) 50 MG tablet [Pharmacy Med Name: topiramate 50 mg tablet] 30 tablet 2     Sig: GIVE ONE Tablet BY MOUTH DAILY   • QUEtiapine (SEROquel) 200 MG tablet [Pharmacy Med Name: quetiapine 200 mg tablet] 30 tablet 2     Sig: GIVE 1 Tablet BY MOUTH AT BEDTIME     Are these all okay to be filled?

## 2022-01-14 DIAGNOSIS — G47.00 INSOMNIA, UNSPECIFIED TYPE: ICD-10-CM

## 2022-01-14 RX ORDER — DICYCLOMINE HCL 20 MG
TABLET ORAL
Qty: 120 TABLET | Refills: 2 | Status: SHIPPED | OUTPATIENT
Start: 2022-01-14

## 2022-01-14 RX ORDER — TRAZODONE HYDROCHLORIDE 150 MG/1
TABLET ORAL
Qty: 15 TABLET | Refills: 2 | Status: SHIPPED | OUTPATIENT
Start: 2022-01-14

## 2022-01-14 RX ORDER — CITALOPRAM 20 MG/1
TABLET ORAL
Qty: 30 TABLET | Refills: 0 | Status: SHIPPED | OUTPATIENT
Start: 2022-01-14

## 2022-01-14 RX ORDER — ATORVASTATIN CALCIUM 10 MG/1
TABLET, FILM COATED ORAL
Qty: 30 TABLET | Refills: 0 | Status: SHIPPED | OUTPATIENT
Start: 2022-01-14

## 2022-01-14 RX ORDER — QUETIAPINE FUMARATE 200 MG/1
TABLET, FILM COATED ORAL
Qty: 30 TABLET | Refills: 2 | Status: SHIPPED | OUTPATIENT
Start: 2022-01-14

## 2022-01-14 RX ORDER — CLONIDINE HYDROCHLORIDE 0.1 MG/1
TABLET ORAL
Qty: 45 TABLET | Refills: 0 | Status: SHIPPED | OUTPATIENT
Start: 2022-01-14

## 2022-01-14 RX ORDER — CHOLECALCIFEROL (VITAMIN D3) 125 MCG
CAPSULE ORAL
Qty: 30 TABLET | Refills: 2 | Status: SHIPPED | OUTPATIENT
Start: 2022-01-14

## 2022-01-14 RX ORDER — TOPIRAMATE 50 MG/1
TABLET, FILM COATED ORAL
Qty: 30 TABLET | Refills: 2 | Status: SHIPPED | OUTPATIENT
Start: 2022-01-14

## 2022-01-14 RX ORDER — ARIPIPRAZOLE 15 MG/1
TABLET ORAL
Qty: 30 TABLET | Refills: 2 | Status: SHIPPED | OUTPATIENT
Start: 2022-01-14

## 2022-01-14 RX ORDER — CHLORPROMAZINE HYDROCHLORIDE 25 MG/1
TABLET, FILM COATED ORAL
Qty: 60 TABLET | Refills: 0 | Status: SHIPPED | OUTPATIENT
Start: 2022-01-14

## 2022-01-14 RX ORDER — DIVALPROEX SODIUM 500 MG/1
TABLET, DELAYED RELEASE ORAL
Qty: 90 TABLET | Refills: 2 | Status: SHIPPED | OUTPATIENT
Start: 2022-01-14

## 2022-01-18 DIAGNOSIS — E78.00 HYPERCHOLESTEROLEMIA: ICD-10-CM

## 2022-01-18 DIAGNOSIS — L70.9 ACNE, UNSPECIFIED ACNE TYPE: ICD-10-CM

## 2022-01-18 DIAGNOSIS — G47.00 INSOMNIA, UNSPECIFIED TYPE: ICD-10-CM

## 2022-01-18 DIAGNOSIS — F84.0 AUTISM SPECTRUM DISORDER: ICD-10-CM

## 2022-01-18 DIAGNOSIS — F41.8 MIXED ANXIETY AND DEPRESSIVE DISORDER: ICD-10-CM

## 2022-01-18 RX ORDER — CHLORPROMAZINE HYDROCHLORIDE 25 MG/1
TABLET, FILM COATED ORAL
Qty: 56 TABLET | Refills: 0 | OUTPATIENT
Start: 2022-01-18

## 2022-01-18 RX ORDER — CLONIDINE HYDROCHLORIDE 0.1 MG/1
TABLET ORAL
Qty: 42 TABLET | Refills: 0 | OUTPATIENT
Start: 2022-01-18

## 2022-01-18 RX ORDER — CITALOPRAM 20 MG/1
TABLET ORAL
Qty: 28 TABLET | Refills: 0 | OUTPATIENT
Start: 2022-01-18

## 2022-01-18 RX ORDER — ATORVASTATIN CALCIUM 10 MG/1
TABLET, FILM COATED ORAL
Qty: 28 TABLET | Refills: 0 | OUTPATIENT
Start: 2022-01-18

## 2022-01-18 RX ORDER — CLINDAMYCIN AND BENZOYL PEROXIDE 10; 50 MG/G; MG/G
GEL TOPICAL
Qty: 50 G | Refills: 1 | Status: SHIPPED | OUTPATIENT
Start: 2022-01-18

## 2022-02-04 DIAGNOSIS — F41.8 MIXED ANXIETY AND DEPRESSIVE DISORDER: ICD-10-CM

## 2022-02-04 DIAGNOSIS — G47.00 INSOMNIA, UNSPECIFIED TYPE: ICD-10-CM

## 2022-02-04 DIAGNOSIS — F84.0 AUTISM SPECTRUM DISORDER: ICD-10-CM

## 2022-02-04 DIAGNOSIS — E78.00 HYPERCHOLESTEROLEMIA: ICD-10-CM

## 2022-02-08 RX ORDER — ATORVASTATIN CALCIUM 10 MG/1
TABLET, FILM COATED ORAL
Qty: 30 TABLET | Refills: 0 | OUTPATIENT
Start: 2022-02-08

## 2022-02-08 RX ORDER — CLONIDINE HYDROCHLORIDE 0.1 MG/1
TABLET ORAL
Qty: 45 TABLET | Refills: 0 | OUTPATIENT
Start: 2022-02-08

## 2022-02-08 RX ORDER — CHLORPROMAZINE HYDROCHLORIDE 25 MG/1
TABLET, FILM COATED ORAL
Qty: 60 TABLET | Refills: 0 | OUTPATIENT
Start: 2022-02-08

## 2022-02-08 RX ORDER — CITALOPRAM 20 MG/1
TABLET ORAL
Qty: 30 TABLET | Refills: 0 | OUTPATIENT
Start: 2022-02-08

## 2022-02-24 ENCOUNTER — TELEMEDICINE (OUTPATIENT)
Dept: FAMILY MEDICINE CLINIC | Facility: CLINIC | Age: 27
End: 2022-02-24

## 2022-02-24 VITALS — WEIGHT: 161 LBS | HEIGHT: 65 IN | BODY MASS INDEX: 26.82 KG/M2

## 2022-02-24 DIAGNOSIS — L65.9 LOSS OF HAIR: Primary | ICD-10-CM

## 2022-02-24 DIAGNOSIS — Z51.81 MEDICATION MONITORING ENCOUNTER: ICD-10-CM

## 2022-02-24 PROCEDURE — 99214 OFFICE O/P EST MOD 30 MIN: CPT | Performed by: NURSE PRACTITIONER

## 2022-02-24 RX ORDER — SPIRONOLACTONE 100 MG/1
TABLET, FILM COATED ORAL
Qty: 30 TABLET | Refills: 2 | Status: SHIPPED | OUTPATIENT
Start: 2022-02-24

## 2022-02-24 NOTE — PROGRESS NOTES
"This was an audio and video enabled telemedicine encounter. Patient's caregiver verbally consented to visit. Patient was located at Home and I was located at [facility]  location.     Chief Complaint  Alopecia    Subjective    History of Present Illness      Patient presents to [facility] for   Patient's caregiver says she is not losing as much hair but her head of hair is so thin and not filling out anymore. She has trouble doing anything with it because of the initial hair loss. She was a referral to a dermatologist.        Review of Systems   HENT:        Hair loss    All other systems reviewed and are negative.      I have reviewed and agree with the HPI and ROS information as above.  Manuela Almanza Jose Luis, APRN     Objective   Vital Signs:   Ht 165.1 cm (65\")   Wt 73 kg (161 lb)   BMI 26.79 kg/m²       Physical Exam  Constitutional:       Appearance: Normal appearance. She is well-developed.      Comments: Thin appearing hair, no balding patches    HENT:      Head: Normocephalic and atraumatic.      Nose: No congestion.      Mouth/Throat:      Lips: Pink. No lesions.   Eyes:      General: Lids are normal. Vision grossly intact.      Conjunctiva/sclera:      Right eye: Right conjunctiva is not injected.      Left eye: Left conjunctiva is not injected.   Pulmonary:      Effort: Pulmonary effort is normal.   Musculoskeletal:         General: Normal range of motion.      Cervical back: Full passive range of motion without pain, normal range of motion and neck supple.   Skin:     General: Skin is dry.   Neurological:      Mental Status: She is alert.      Motor: Motor function is intact.      Comments: Unable to assess    Psychiatric:         Speech: She is noncommunicative.      Comments: Caregiver present           Result Review  Data Reviewed:          CBC w AUTO Differential (10/01/2021 14:18)  TSH (10/01/2021 14:18)  Vitamin B12 (10/01/2021 14:18)  Comprehensive metabolic " panel (10/01/2021 14:18)      TSH (01/14/2021 12:57)  Hemoglobin A1c (01/14/2021 12:57)  Valproic Acid Level, Total (01/14/2021 12:57)       Assessment and Plan {CC Problem List  Visit Diagnosis  ROS  Review (Popup)  Health Maintenance  Quality  BestPractice  Medications  SmartSets  SnapShot Encounters  Media :23}     Problem List Items Addressed This Visit     None      Visit Diagnoses     Loss of hair    -  Primary    Relevant Medications    spironolactone (ALDACTONE) 100 MG tablet    Other Relevant Orders    Ambulatory Referral to Dermatology    Medication monitoring encounter        Relevant Orders    Basic metabolic panel      Here for hair loss follow up on telehealth, with caregiver. Has been on vitamins, had labs evaluated with no improvement. Low dose spironolactone was started. Caregiver does feel this has slowed the progression of her hair loss but they want a derm consult at this time.   Plan:   1. Increase dose to 100mg. Monitoring bmp in 6 weeks.   2. Derm consult initiated per their request.         Follow Up   Return in about 6 weeks (around 4/7/2022).  Patient was given instructions and counseling regarding her condition or for health maintenance advice. Please see specific information pulled into the AVS if appropriate.

## 2022-04-01 DIAGNOSIS — F84.0 AUTISM SPECTRUM DISORDER: ICD-10-CM

## 2022-04-01 DIAGNOSIS — F41.8 MIXED ANXIETY AND DEPRESSIVE DISORDER: ICD-10-CM

## 2022-04-01 RX ORDER — ALPRAZOLAM 1 MG/1
1 TABLET ORAL 3 TIMES DAILY PRN
Qty: 30 TABLET | Refills: 0 | Status: SHIPPED | OUTPATIENT
Start: 2022-04-01

## 2022-04-01 NOTE — TELEPHONE ENCOUNTER
Caller: Krista Kauffman    Relationship: Self    Requested Prescriptions:   Requested Prescriptions     Pending Prescriptions Disp Refills   • ALPRAZolam (XANAX) 1 MG tablet 30 tablet 0     Sig: Take 1 tablet by mouth 3 (Three) Times a Day As Needed for Anxiety.        Pharmacy where request should be sent: [id number] - [place], [place] - [address] AT ACROSS FROM [address] - [phone] [facility] [phone] GRAY Cuevas, AUNDREA   04/01/22 15:07 CDT

## 2022-04-03 ENCOUNTER — HOSPITAL ENCOUNTER (EMERGENCY)
Age: 27
Discharge: PSYCHIATRIC OR SUBSTANCE USE FACILITY | End: 2022-04-04
Attending: EMERGENCY MEDICINE
Payer: COMMERCIAL

## 2022-04-03 DIAGNOSIS — F84.0 AUTISM: Primary | ICD-10-CM

## 2022-04-03 DIAGNOSIS — R46.89 AGGRESSIVE BEHAVIOR: ICD-10-CM

## 2022-04-03 PROCEDURE — 99284 EMERGENCY DEPT VISIT MOD MDM: CPT

## 2022-04-03 PROCEDURE — 6360000002 HC RX W HCPCS: Performed by: EMERGENCY MEDICINE

## 2022-04-03 PROCEDURE — 96372 THER/PROPH/DIAG INJ SC/IM: CPT

## 2022-04-03 RX ORDER — HALOPERIDOL 5 MG/ML
5 INJECTION INTRAMUSCULAR ONCE
Status: COMPLETED | OUTPATIENT
Start: 2022-04-03 | End: 2022-04-03

## 2022-04-03 RX ORDER — HALOPERIDOL 5 MG/ML
INJECTION INTRAMUSCULAR
Status: DISPENSED
Start: 2022-04-03 | End: 2022-04-04

## 2022-04-03 RX ADMIN — HALOPERIDOL 5 MG: 5 INJECTION INTRAMUSCULAR at 23:08

## 2022-04-04 VITALS
SYSTOLIC BLOOD PRESSURE: 88 MMHG | RESPIRATION RATE: 20 BRPM | TEMPERATURE: 97.9 F | OXYGEN SATURATION: 94 % | DIASTOLIC BLOOD PRESSURE: 58 MMHG | HEART RATE: 75 BPM

## 2022-04-04 LAB
ALBUMIN SERPL-MCNC: 4.5 G/DL (ref 3.5–5.2)
ALP BLD-CCNC: 56 U/L (ref 35–104)
ALT SERPL-CCNC: 11 U/L (ref 5–33)
AMPHETAMINE SCREEN, URINE: NEGATIVE
ANION GAP SERPL CALCULATED.3IONS-SCNC: 13 MMOL/L (ref 7–19)
AST SERPL-CCNC: 13 U/L (ref 5–32)
BARBITURATE SCREEN URINE: NEGATIVE
BASOPHILS ABSOLUTE: 0.1 K/UL (ref 0–0.2)
BASOPHILS RELATIVE PERCENT: 0.5 % (ref 0–1)
BENZODIAZEPINE SCREEN, URINE: POSITIVE
BILIRUB SERPL-MCNC: <0.2 MG/DL (ref 0.2–1.2)
BILIRUBIN URINE: NEGATIVE
BLOOD, URINE: NEGATIVE
BUN BLDV-MCNC: 24 MG/DL (ref 6–20)
CALCIUM SERPL-MCNC: 9.9 MG/DL (ref 8.6–10)
CANNABINOID SCREEN URINE: NEGATIVE
CHLORIDE BLD-SCNC: 107 MMOL/L (ref 98–111)
CLARITY: ABNORMAL
CO2: 21 MMOL/L (ref 22–29)
COCAINE METABOLITE SCREEN URINE: NEGATIVE
COLOR: YELLOW
CREAT SERPL-MCNC: 0.8 MG/DL (ref 0.5–0.9)
EOSINOPHILS ABSOLUTE: 0.1 K/UL (ref 0–0.6)
EOSINOPHILS RELATIVE PERCENT: 0.8 % (ref 0–5)
ETHANOL: <10 MG/DL (ref 0–0.08)
GFR AFRICAN AMERICAN: >59
GFR NON-AFRICAN AMERICAN: >60
GLUCOSE BLD-MCNC: 75 MG/DL (ref 74–109)
GLUCOSE URINE: NEGATIVE MG/DL
HCG QUALITATIVE: NEGATIVE
HCT VFR BLD CALC: 40.6 % (ref 37–47)
HEMOGLOBIN: 13 G/DL (ref 12–16)
IMMATURE GRANULOCYTES #: 0.1 K/UL
KETONES, URINE: NEGATIVE MG/DL
LEUKOCYTE ESTERASE, URINE: NEGATIVE
LYMPHOCYTES ABSOLUTE: 2.9 K/UL (ref 1.1–4.5)
LYMPHOCYTES RELATIVE PERCENT: 19.8 % (ref 20–40)
Lab: ABNORMAL
MCH RBC QN AUTO: 32.2 PG (ref 27–31)
MCHC RBC AUTO-ENTMCNC: 32 G/DL (ref 33–37)
MCV RBC AUTO: 100.5 FL (ref 81–99)
MONOCYTES ABSOLUTE: 1.4 K/UL (ref 0–0.9)
MONOCYTES RELATIVE PERCENT: 9.5 % (ref 0–10)
NEUTROPHILS ABSOLUTE: 10 K/UL (ref 1.5–7.5)
NEUTROPHILS RELATIVE PERCENT: 69 % (ref 50–65)
NITRITE, URINE: NEGATIVE
OPIATE SCREEN URINE: NEGATIVE
PDW BLD-RTO: 11.9 % (ref 11.5–14.5)
PH UA: 7 (ref 5–8)
PLATELET # BLD: 375 K/UL (ref 130–400)
PMV BLD AUTO: 9.8 FL (ref 9.4–12.3)
POTASSIUM SERPL-SCNC: 3.4 MMOL/L (ref 3.5–5)
PROTEIN UA: NEGATIVE MG/DL
RBC # BLD: 4.04 M/UL (ref 4.2–5.4)
SARS-COV-2, NAAT: NOT DETECTED
SODIUM BLD-SCNC: 141 MMOL/L (ref 136–145)
SPECIFIC GRAVITY UA: 1.02 (ref 1–1.03)
TOTAL PROTEIN: 6.8 G/DL (ref 6.6–8.7)
UROBILINOGEN, URINE: 0.2 E.U./DL
WBC # BLD: 14.5 K/UL (ref 4.8–10.8)

## 2022-04-04 PROCEDURE — 80307 DRUG TEST PRSMV CHEM ANLYZR: CPT

## 2022-04-04 PROCEDURE — 81003 URINALYSIS AUTO W/O SCOPE: CPT

## 2022-04-04 PROCEDURE — 80053 COMPREHEN METABOLIC PANEL: CPT

## 2022-04-04 PROCEDURE — 85025 COMPLETE CBC W/AUTO DIFF WBC: CPT

## 2022-04-04 PROCEDURE — 84703 CHORIONIC GONADOTROPIN ASSAY: CPT

## 2022-04-04 PROCEDURE — 82077 ASSAY SPEC XCP UR&BREATH IA: CPT

## 2022-04-04 PROCEDURE — 36415 COLL VENOUS BLD VENIPUNCTURE: CPT

## 2022-04-04 PROCEDURE — 87635 SARS-COV-2 COVID-19 AMP PRB: CPT
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
## 2022-04-04 NOTE — ED PROVIDER NOTES
[facility] EMERGENCY DEPT  eMERGENCY dEPARTMENT eNCOUnter      Pt Name: Rika Quiñones  MRN: [id number]  [place] 1995  Date of evaluation: 4/3/2022  Provider: Toyin Sheehan MD    CHIEF COMPLAINT       Chief Complaint   Patient presents with    Mental Health Problem     Pt mother states pt has become more aggitated over the past 2 weeks. Tonight pt was unable to be calmed down and became more aggitated         HISTORY OF PRESENT ILLNESS   (Location/Symptom, Timing/Onset,Context/Setting, Quality, Duration, Modifying Factors, Severity)  Note limiting factors. Rika Quiñones is a 33 y.o. female who presents to the emergency department via EMS regarding increased agitation and out-of-control behavior. Patient has a previous history of autism and lives at home with a caretaker. Apparently earlier this evening she became increasingly agitated and family was unable to get her to calm down. A lot of times when this occurs they can take her for a ride in the vehicle and listen to music and that helps her to calm down however this evening she became more more agitated and upset in the backseat of the vehicle. She became so upset and aggressive towards the  the vehicle that they had to pull over the side of the road and called EMS. On arrival to the ED patient is agitated, yelling. Family reports that she has been having some increased frequency of outbursts that have been similar although not this severe. They have an upcoming appointment with their psychiatrist.  No recent medication changes. According to family she has not had any recent illnesses, fever, vomiting or diarrhea. HPI    NursingNotes were reviewed.     REVIEW OF SYSTEMS    (2-9 systems for level 4, 10 or more for level 5)     Review of Systems   Unable to perform ROS: Other            PAST MEDICALHISTORY     Past Medical History:   Diagnosis Date    Autism          SURGICAL HISTORY       Past Surgical History:   Procedure Laterality Date    HYSTERECTOMY           CURRENT MEDICATIONS     Previous Medications    ALPRAZOLAM (XANAX) 0.5 MG TABLET    as needed. ARIPIPRAZOLE (ABILIFY) 10 MG TABLET    Take 10 mg by mouth 2 times daily     ATORVASTATIN (LIPITOR) 10 MG TABLET    Take 10 mg by mouth daily    CHLORPROMAZINE (THORAZINE) 25 MG TABLET    Take 25 mg by mouth 2 times daily     CITALOPRAM HYDROBROMIDE (CELEXA PO)    Take 20 mg by mouth daily     CLINDAMYCIN HCL PO    Take by mouth    CLONIDINE HCL    Take 0.15 mg by mouth daily Indications: at bedtime 1 1/2 tablets at bedtime    DEXTROMETHORPHAN-QUINIDINE (NUEDEXTA) 20-10 MG CAPS PER CAPSULE    Take 1 capsule by mouth daily    DICYCLOMINE (BENTYL) 20 MG TABLET    Take 20 mg by mouth every 6 hours    DIVALPROEX SODIUM (DEPAKOTE PO)    Take 500 mg by mouth 4 times daily     MELATONIN 5 MG CAPS    melatonin 5 mg capsule   Take 1 capsule every day by oral route at bedtime for 30 days.     METFORMIN (GLUCOPHAGE) 500 MG TABLET    metformin 500 mg tablet   1 tab BID   monitor for diarrhea    MIRTAZAPINE (REMERON) 15 MG TABLET    mirtazapine 15 mg tablet   TAKE 1 TABLET BY MOUTH AT BEDTIME    PSYLLIUM (METAMUCIL PO)    Take by mouth    QUETIAPINE (SEROQUEL) 200 MG TABLET    quetiapine 200 mg tablet   TAKE 1 TABLET BY MOUTH AT BEDTIME    TRAZODONE HCL PO    Take 150 mg by mouth nightly 1/2 Tablet at bedtime       ALLERGIES     Bactrim [sulfamethoxazole-trimethoprim]    FAMILY HISTORY       Family History   Problem Relation Age of Onset    High Cholesterol Father     Heart Attack Paternal Grandfather           SOCIAL HISTORY       Social History     Socioeconomic History    Marital status: Single     Spouse name: Not on file    Number of children: Not on file    Years of education: Not on file    Highest education level: Not on file   Occupational History    Not on file   Tobacco Use    Smoking status: Never Smoker    Smokeless tobacco: Never Used   Substance and Sexual Activity  Alcohol use: Never     Alcohol/week: 0.0 standard drinks    Drug use: Never    Sexual activity: Not on file   Other Topics Concern    Not on file   Social History Narrative    Not on file     Social Determinants of Health     Financial Resource Strain:     Difficulty of Paying Living Expenses: Not on file   Food Insecurity:     Worried About Running Out of Food in the Last Year: Not on file    Yakelin of Food in the Last Year: Not on file   Transportation Needs:     Lack of Transportation (Medical): Not on file    Lack of Transportation (Non-Medical): Not on file   Physical Activity:     Days of Exercise per Week: Not on file    Minutes of Exercise per Session: Not on file   Stress:     Feeling of Stress : Not on file   Social Connections:     Frequency of Communication with Friends and Family: Not on file    Frequency of Social Gatherings with Friends and Family: Not on file    Attends [employer] Services: Not on file    Active Member of [address] [place] [address] or Organizations: Not on file    Attends Club or Organization Meetings: Not on file    Marital Status: Not on file   Intimate Partner Violence:     Fear of Current or Ex-Partner: Not on file    Emotionally Abused: Not on file    Physically Abused: Not on file    Sexually Abused: Not on file   Housing Stability:     Unable to Pay for Housing in the Last Year: Not on file    Number of [place] in the Last Year: Not on file    Unstable Housing in the Last Year: Not on file       SCREENINGS    [place]ominique Coma Scale  Eye Opening: Spontaneous  Best Verbal Response: Oriented  Best Motor Response: Obeys commands  [place] Coma Scale Score: 15        PHYSICAL EXAM    (up to 7 for level 4, 8 or more for level 5)     ED Triage Vitals [04/03/22 2249]   BP Temp Temp Source Pulse Resp SpO2 Height Weight   105/78 97.9 °F (36.6 °C) Axillary 75 18 98 % -- --       Physical Exam  Vitals and nursing note reviewed. HENT:      Head: Atraumatic.       Mouth/Throat: Mouth: Mucous membranes are not dry. Eyes:      Pupils: Pupils are equal, round, and reactive to light. Neck:      Trachea: No tracheal deviation. Cardiovascular:      Rate and Rhythm: Normal rate and regular rhythm. Heart sounds: Normal heart sounds. No murmur heard. Pulmonary:      Effort: Pulmonary effort is normal. No respiratory distress. Breath sounds: Normal breath sounds. No stridor. Abdominal:      General: There is no distension. Palpations: Abdomen is soft. Tenderness: There is no abdominal tenderness. There is no guarding. Skin:     Capillary Refill: Capillary refill takes less than 2 seconds. Coloration: Skin is not pale. Findings: No rash. Neurological:      Mental Status: She is alert. Comments: Seems to be moving upper and lower extremities. She  is not cooperative with the exam   Psychiatric:         Mood and Affect: Affect is labile. Behavior: Behavior is agitated and combative. DIAGNOSTIC RESULTS         LABS:  Labs Reviewed   COMPREHENSIVE METABOLIC PANEL - Abnormal; Notable for the following components:       Result Value    Potassium 3.4 (*)     CO2 21 (*)     BUN 24 (*)     All other components within normal limits   CBC WITH AUTO DIFFERENTIAL - Abnormal; Notable for the following components:    WBC 14.5 (*)     RBC 4.04 (*)     .5 (*)     MCH 32.2 (*)     MCHC 32.0 (*)     Neutrophils % 69.0 (*)     Lymphocytes % 19.8 (*)     Neutrophils Absolute 10.0 (*)     Monocytes Absolute 1.40 (*)     All other components within normal limits   URINALYSIS - Abnormal; Notable for the following components:    Clarity, UA CLOUDY (*)     All other components within normal limits   URINE DRUG SCREEN   HCG, SERUM, QUALITATIVE   ETHANOL       All other labs were within normal range or not returned as of this dictation.     EMERGENCY DEPARTMENT COURSE and DIFFERENTIAL DIAGNOSIS/MDM:   Vitals:    Vitals:    04/03/22 2249   BP: 105/78 Pulse: 75   Resp: 18   Temp: 97.9 °F (36.6 °C)   TempSrc: Axillary   SpO2: 98%       MDM    Reassessment    Patient with significant agitation upon arrival here to the emergency department. We have given her a dose of some Haldol. At this point she is resting comfortably in bed. Urinalysis is unremarkable for evidence of infection. Her other electrolytes look okay. Reexam: She is sitting up in bed, eating a sandwich at this time. She seems very calm. We will plan to have behavioral health intake team evaluate her at the request of her caretaker. Patient was seen and evaluated by mental health professional and after discussion with attending psychiatrist, Dr. Elana Lechuga, patient was recommended for transfer to [facility] for further evaluation and treatment. Due to patient's increasingly aggressive behavior she was recommended for involuntary hospitalization and transfer to [facility].  At this time patient is awaiting [facility] paperwork from Dr. Rayma Cushing. I have reviewed the case with oncoming ED physician,  Dr. Jasmyn Regan and the plan of care for transfer. PROCEDURES:  Unless otherwise noted below, none     Procedures    FINAL IMPRESSION      1. Autism    2.  Aggressive behavior          DISPOSITION/PLAN   DISPOSITION Transfer      (Please note that portions of this note were completed with a voice recognition program.  Efforts were made to edit thedictations but occasionally words are mis-transcribed.)    Michele Patel MD (electronically signed)  Attending Emergency Physician         Michele Patel MD  04/04/22 4376       Michele Patel MD  04/04/22 1629       Michele Patel MD  04/04/22 9732

## 2022-04-04 NOTE — ED PROVIDER NOTES
[facility] EMERGENCY DEPT  eMERGENCY dEPARTMENT eNCOUnter      Pt Name: Rolando Tapia  MRN: [id number]  [place] 1995  Date of evaluation: 4/3/2022  Provider: Cash Gaston MD    CHIEF COMPLAINT       Chief Complaint   Patient presents with    Mental Health Problem     Pt mother states pt has become more aggitated over the past 2 weeks. Tonight pt was unable to be calmed down and became more aggitated     Assumed care at end of shift pending transfer to [facility]    Pt accepted by Dr Robbie Boothe at [facility]. Would like court order faxed. Notified HUC and charge RN to send.     PHYSICAL EXAM    (up to 7 for level 4, 8 or more for level 5)     ED Triage Vitals [04/03/22 2249]   BP Temp Temp Source Pulse Resp SpO2 Height Weight   105/78 97.9 °F (36.6 °C) Axillary 75 18 98 % -- --       Physical Exam    DIAGNOSTIC RESULTS     EKG: All EKG's are interpreted by theEmergency Department Physician who either signs or Co-signs this chart in the absence of a cardiologist.        RADIOLOGY:   Non-plain film images such as CT, Ultrasound and MRI are read by the radiologist. Plain radiographic images are visualized and preliminarily interpreted by the emergencyphysician with the below findings:        Interpretation per the Radiologist below, if available at the time of thisnote:    No orders to display         ED BEDSIDE ULTRASOUND:   Performed by ED Physician - none    LABS:  Labs Reviewed   COMPREHENSIVE METABOLIC PANEL - Abnormal; Notable for the following components:       Result Value    Potassium 3.4 (*)     CO2 21 (*)     BUN 24 (*)     All other components within normal limits   CBC WITH AUTO DIFFERENTIAL - Abnormal; Notable for the following components:    WBC 14.5 (*)     RBC 4.04 (*)     .5 (*)     MCH 32.2 (*)     MCHC 32.0 (*)     Neutrophils % 69.0 (*)     Lymphocytes % 19.8 (*)     Neutrophils Absolute 10.0 (*)     Monocytes Absolute 1.40 (*)     All other components within normal limits   URINALYSIS - Abnormal; Notable for the following components:    Clarity, UA CLOUDY (*)     All other components within normal limits   URINE DRUG SCREEN - Abnormal; Notable for the following components:    Benzodiazepine Screen, Urine Positive (*)     All other components within normal limits   COVID-19, RAPID   HCG, SERUM, QUALITATIVE   ETHANOL       All other labs were within normal range or not returned as of this dictation. EMERGENCY DEPARTMENT COURSE and DIFFERENTIAL DIAGNOSIS/MDM:   Vitals:    Vitals:    04/03/22 2249 04/04/22 0522 04/04/22 0536   BP: 105/78 (!) 182/98 (!) 88/58   Pulse: 75 74 75   Resp: 18 20 20   Temp: 97.9 °F (36.6 °C)     TempSrc: Axillary     SpO2: 98% 95% 94%       MDM      CONSULTS:  None    PROCEDURES:  Unless otherwise noted below, none      Procedures    FINAL IMPRESSION      1. Autism    2.  Aggressive behavior          DISPOSITION/PLAN   DISPOSITION Decision To Transfer    PATIENT REFERRED TO:  Kelle Ponce MD  [address] [place] [id number]  [phone]            DISCHARGE MEDICATIONS:  New Prescriptions    No medications on file          (Please note that portions of this note were completed with a voice recognition program.  Efforts were made to edit the dictations but occasionally words aremis-transcribed.)    Jessy Frazier MD (electronically signed)  Attending Emergency Physician           Jessy Frazier MD  04/04/22 3137

## 2022-04-04 NOTE — ED NOTES
Attempted to assist patient to bedside toilet. Pt unable to urinate at this time. Will attempt again.       Perla Bermudez RN  04/04/22 0849

## 2022-04-04 NOTE — ED NOTES
Report called to [facility] at this time to [facility] [place] [facility]  04/04/22 1951

## 2022-04-04 NOTE — ED NOTES
Pt awake and calm. Pt states that she is hungry. Pt given sandwich tray. Pt tolerating well.      Robinson Underwood RN  04/04/22 0115

## 2022-04-04 NOTE — ED NOTES
On hold with [facility].  They disconnected the call, had to call back      Neela Copping  04/04/22 2303

## 2022-04-04 NOTE — ED NOTES
Spoke with patients caregiver. Per caregiver she would like pt to have a psych evaluation. Caregiver states that tonight they went to get ice cream and pt climbed over 3 rows of seats in her car to get to her. Per caregiver pt has been on multiple psych medications for several years. She does not think they are working at this time. Caregiver states that she does not feel like she will be able to adjust psych medications at home safely. Dr. Jorge A Cabrera notified. SALBADOR Stern called for psych evaluation.       Joshua Varela RN  04/04/22 9734

## 2022-04-04 NOTE — ED TRIAGE NOTES
Pt mother states they are unable to get into the psychiatrist for the next few weeks to see if they need to change her medications

## 2022-04-04 NOTE — ED NOTES
This RN has been on hold with [address] [place] [address] for 20 minutes at this time, I will call again to make them aware once more that I have to give report - MD report already occurred, pt info has been faxed prior to this call - court order was obtained for pt transfer/ admission.       Deshaun, [facility]  04/04/22 1136

## 2022-05-25 ENCOUNTER — TELEPHONE (OUTPATIENT)
Dept: FAMILY MEDICINE CLINIC | Facility: CLINIC | Age: 27
End: 2022-05-25

## 2022-05-25 NOTE — TELEPHONE ENCOUNTER
[facility] CALLED IN TO LET PCP KNOW THAT PATIENT IS GOING   TO [facility] IN [place] FOR TREATMENT     GOOD CALL BACK   [phone]

## 2022-06-07 ENCOUNTER — TELEPHONE (OUTPATIENT)
Dept: FAMILY MEDICINE CLINIC | Facility: CLINIC | Age: 27
End: 2022-06-07

## 2022-06-07 NOTE — TELEPHONE ENCOUNTER
I have checked [facility], no immunizations documented here. The only immunizations we have noted in her chart are the Covid. I have called [facility], spoke to Ashley and informed of this. She voiced understanding.

## 2022-06-07 NOTE — TELEPHONE ENCOUNTER
Caller: Nel    Relationship to patient:     Best call back number: [phone]    Patient is needing an immunization certificate for the patient. This is a developmentally delayed facility that the patient is in.       FAX: [phone]

## 2022-10-05 ENCOUNTER — HOSPITAL ENCOUNTER (OUTPATIENT)
Facility: HOSPITAL | Age: 27
Discharge: HOME | End: 2022-10-05
Attending: OBSTETRICS & GYNECOLOGY | Admitting: OBSTETRICS & GYNECOLOGY

## 2022-10-05 ENCOUNTER — APPOINTMENT (OUTPATIENT)
Dept: ULTRASOUND IMAGING | Facility: HOSPITAL | Age: 27
End: 2022-10-05

## 2022-10-05 VITALS
HEART RATE: 126 BPM | RESPIRATION RATE: 20 BRPM | TEMPERATURE: 97.4 F | DIASTOLIC BLOOD PRESSURE: 74 MMHG | SYSTOLIC BLOOD PRESSURE: 110 MMHG | OXYGEN SATURATION: 99 %

## 2022-10-05 DIAGNOSIS — N83.202 CYST OF LEFT OVARY: Primary | ICD-10-CM

## 2022-10-05 PROBLEM — N83.209 OVARIAN CYST: Status: ACTIVE | Noted: 2022-10-05

## 2022-10-05 PROBLEM — J02.0 STREPTOCOCCAL PHARYNGITIS: Status: ACTIVE | Noted: 2022-10-05

## 2022-10-05 PROCEDURE — G0378 HOSPITAL OBSERVATION PER HR: HCPCS

## 2022-10-05 PROCEDURE — 76856 US EXAM PELVIC COMPLETE: CPT

## 2022-10-05 PROCEDURE — 99235 HOSP IP/OBS SAME DATE MOD 70: CPT | Performed by: OBSTETRICS & GYNECOLOGY

## 2022-10-05 RX ORDER — SODIUM CHLORIDE 0.9 % (FLUSH) 0.9 %
10 SYRINGE (ML) INJECTION EVERY 12 HOURS SCHEDULED
Status: DISCONTINUED | OUTPATIENT
Start: 2022-10-05 | End: 2022-10-05 | Stop reason: HOSPADM

## 2022-10-05 RX ORDER — ACETAMINOPHEN 160 MG/5ML
650 SOLUTION ORAL EVERY 4 HOURS PRN
Status: DISCONTINUED | OUTPATIENT
Start: 2022-10-05 | End: 2022-10-05 | Stop reason: HOSPADM

## 2022-10-05 RX ORDER — AMOXICILLIN AND CLAVULANATE POTASSIUM 875; 125 MG/1; MG/1
1 TABLET, FILM COATED ORAL 2 TIMES DAILY
Status: DISCONTINUED | OUTPATIENT
Start: 2022-10-05 | End: 2022-10-05 | Stop reason: HOSPADM

## 2022-10-05 RX ORDER — TRAMADOL HYDROCHLORIDE 50 MG/1
50 TABLET ORAL EVERY 6 HOURS PRN
Qty: 8 TABLET | Refills: 0 | Status: SHIPPED | OUTPATIENT
Start: 2022-10-05

## 2022-10-05 RX ORDER — SODIUM CHLORIDE 0.9 % (FLUSH) 0.9 %
10 SYRINGE (ML) INJECTION AS NEEDED
Status: DISCONTINUED | OUTPATIENT
Start: 2022-10-05 | End: 2022-10-05 | Stop reason: HOSPADM

## 2022-10-05 RX ORDER — ACETAMINOPHEN 650 MG/1
650 SUPPOSITORY RECTAL EVERY 4 HOURS PRN
Status: DISCONTINUED | OUTPATIENT
Start: 2022-10-05 | End: 2022-10-05 | Stop reason: HOSPADM

## 2022-10-05 RX ORDER — SODIUM CHLORIDE 450 MG/100ML
100 INJECTION, SOLUTION INTRAVENOUS CONTINUOUS
Status: DISCONTINUED | OUTPATIENT
Start: 2022-10-05 | End: 2022-10-05 | Stop reason: HOSPADM

## 2022-10-05 RX ORDER — TRAMADOL HYDROCHLORIDE 50 MG/1
50 TABLET ORAL EVERY 6 HOURS PRN
Status: DISCONTINUED | OUTPATIENT
Start: 2022-10-05 | End: 2022-10-05 | Stop reason: HOSPADM

## 2022-10-05 RX ORDER — ONDANSETRON 2 MG/ML
4 INJECTION INTRAMUSCULAR; INTRAVENOUS EVERY 6 HOURS PRN
Status: DISCONTINUED | OUTPATIENT
Start: 2022-10-05 | End: 2022-10-05 | Stop reason: HOSPADM

## 2022-10-05 RX ORDER — AMOXICILLIN AND CLAVULANATE POTASSIUM 875; 125 MG/1; MG/1
1 TABLET, FILM COATED ORAL 2 TIMES DAILY
Qty: 20 TABLET | Refills: 0 | Status: SHIPPED | OUTPATIENT
Start: 2022-10-05 | End: 2022-10-16

## 2022-10-05 RX ORDER — ACETAMINOPHEN 325 MG/1
650 TABLET ORAL EVERY 4 HOURS PRN
Status: DISCONTINUED | OUTPATIENT
Start: 2022-10-05 | End: 2022-10-05 | Stop reason: HOSPADM

## 2022-10-05 NOTE — H&P
[facility]   HISTORY AND PHYSICAL    Patient Name: Krista Kauffman  : 1995  MRN: [id number]  Primary Care Physician:  Jeremias Layton MD  Date of admission: 10/5/2022    Subjective   Subjective     Chief Complaint: Abdominal pain    History of Present Illness  Krista Kauffman is a 27 y.o. female with autism and profound intellectual disability who presented from [facility] facility to outside hospital after staff had noticed a change in her behavior.  They were able to ascertain a 2-day history of abdominal pain, but they had also noticed diminished appetite over the last 3 to 4 days.  Urine had become concentrated appearing and she was sent to the emergency department for further evaluation.  On evaluation in the emergency department her white count was noted to be elevated and rapid strep test was positive.  A CT scan of the abdomen and pelvis revealed a 8.5 cm cystic mass in the pelvis.  She had a hysterectomy in 2015 to get rid of her menstrual cycles.  I have reviewed that operative note and her uterus tubes and cervix were removed.  No pelvic abnormalities were noted at that time.  She was transferred to our facility for further evaluation and care.  She is currently sitting on the side of the bed with caregiver at the bedside.  Her father also just arrived.  History is obtained through chart review and interview of care provider from the care facility.    Review of Systems   Constitutional: Negative for fever.   HENT: Negative for nosebleeds.    Eyes: Negative for discharge.   Respiratory: Negative for cough.    Gastrointestinal: Positive for abdominal pain. Negative for diarrhea and nausea.   Endocrine: Negative for polyuria.   Genitourinary: Negative for vaginal bleeding.   Musculoskeletal: Negative for gait problem.   Skin: Negative for pallor.   Allergic/Immunologic: Negative for immunocompromised state.   Neurological: Negative for seizures.   Hematological: Does not  bruise/bleed easily.        Personal History     Past Medical History:   Diagnosis Date   • Autism    • Bipolar 1 disorder (HCC)    • Borderline intellectual functioning    • Depression    • Irritable bowel syndrome    • Moderate intellectual disabilities    • OCD (obsessive compulsive disorder)        Past Surgical History:   Procedure Laterality Date   • HYSTERECTOMY         Family History: family history is not on file. Otherwise pertinent FHx was reviewed and not pertinent to current issue.    Social History:  reports that she has never smoked. She has never used smokeless tobacco. She reports that she does not drink alcohol and does not use drugs.    Home Medications:  ALPRAZolam, ARIPiprazole, QUEtiapine, Vitamin D3, acetaminophen, atorvastatin, chlorproMAZINE, cholecalciferol, citalopram, clindamycin-benzoyl peroxide, cloNIDine, dicyclomine, divalproex, melatonin, mirtazapine, psyllium, spironolactone, topiramate, and traZODone    Allergies:  Allergies   Allergen Reactions   • Sulfamethoxazole-Trimethoprim Rash       Objective    Objective     Vitals:   Temp:  [97.4 °F (36.3 °C)] 97.4 °F (36.3 °C)  Heart Rate:  [126] 126  Resp:  [20] 20  BP: (110)/(74) 110/74    Physical Exam  Vitals reviewed.   Constitutional:       General: She is not in acute distress.     Appearance: She is not toxic-appearing.   HENT:      Head: Normocephalic and atraumatic.   Eyes:      General: No scleral icterus.  Cardiovascular:      Rate and Rhythm: Normal rate.   Pulmonary:      Effort: Pulmonary effort is normal.   Abdominal:      General: Bowel sounds are normal. There is no distension.      Palpations: Abdomen is soft.      Tenderness: There is no guarding or rebound.      Comments: No tenderness to superficial palpation of the abdomen.  Mild tenderness in the left lower quadrant to deep palpation.  No peritoneal signs.   Skin:     General: Skin is warm and dry.   Neurological:      Mental Status: She is alert. Mental status is  at baseline.   Psychiatric:         Speech: She is noncommunicative.      Comments: Responds to interaction and is at baseline         Result Review    Result Review:  I have personally reviewed the results from the time of this admission to 10/5/2022 15:28 CDT and agree with these findings from outside facility:  [x]  Laboratory list / accordion  []  Microbiology  [x]  Radiology  []  EKG/Telemetry   []  Cardiology/Vascular   []  Pathology  []  Old records  []  Other:  Most notable findings include: Urinalysis with specific gravity 1.015 and no obvious signs of infection, WBC 19.5 with elevated monocytes on differential, hemoglobin 14, CMP normal, lipase normal, COVID negative, rapid strep positive, monotest negative  I reviewed the CT scan report from earlier this morning which is unremarkable except for a 7 x 8.5 cm left-sided pelvic cystic mass.  There is no free fluid or adenopathy.      Assessment & Plan   Assessment / Plan     Brief Patient Summary:  Krista Kauffman is a 27 y.o. female who was transferred for further evaluation of a cystic pelvic mass.    Active Hospital Problems:  Active Hospital Problems    Diagnosis    • Ovarian cyst    • Streptococcal pharyngitis      Plan:   She will be admitted for observation and further work-up.  I have ordered a pelvic ultrasound.  Regular diet and will continue IV fluids for rehydration.  She does not have a surgical abdomen at this time.  Augmentin twice a day to treat strep pharyngitis as a continuation of therapy started in the emergency room at the outside facility.  Disposition pending results of ultrasound, pain control, and if she tolerates diet.    DVT prophylaxis:  Mechanical DVT prophylaxis orders are present.    CODE STATUS:    Code Status (Patient has no pulse and is not breathing): CPR (Attempt to Resuscitate)  Medical Interventions (Patient has pulse or is breathing): Full Support    Admission Status:  I believe this patient meets observation  status.    Brayden Carnes MD

## 2022-10-05 NOTE — DISCHARGE SUMMARY
CHRISTIANO Parikh  Krista PEREZ Kismet  : 1995  MRN: [id number]  CSN: [id number]    Discharge Summary      Date of Admission: 10/5/2022   Date of Discharge: 10/5/2022   Discharge Diagnosis: 1.  Complex left ovarian cyst  2. Streptococcal pharyngitis   Procedures Performed:  Pelvic ultrasound      Consults:  None   Brief History: Patient is a 27 y.o.who was transferred for further evaluation of a cystic pelvic mass.     Hospital Course:  She was evaluated on the floor after admission for observation.  Pelvic ultrasound was performed and I was able to be present for the procedure.  There is a 8.2 cm heterogenous lesion of the left adnexa without associated free fluid.  The right ovary appears normal and color flow was documented to both ovaries.  The patient tolerated regular diet and did not appear to have any significant pain.   Pending Studies: None.     Condition at discharge: stable   Discharge Medications:    Your medication list      START taking these medications      Instructions Last Dose Given Next Dose Due   amoxicillin-clavulanate 875-125 MG per tablet  Commonly known as: AUGMENTIN      Take 1 tablet by mouth 2 (Two) Times a Day for 10 days. Indications: strep pharyngitis       traMADol 50 MG tablet  Commonly known as: ULTRAM      Take 1 tablet by mouth Every 6 (Six) Hours As Needed for Moderate Pain.          CONTINUE taking these medications      Instructions Last Dose Given Next Dose Due   acetaminophen 325 MG tablet  Commonly known as: TYLENOL      GIVE 2 Tablet BY MOUTH EVERY 4 HOURS AS NEEDED FOR PAIN OR FEVER OVER 100.5 DO NOT EXCEED 6 CAPLETS IN 24 HOURS, UNLESS DIRECTED BY A DOCTOR       ALPRAZolam 1 MG tablet  Commonly known as: XANAX      Take 1 tablet by mouth 3 (Three) Times a Day As Needed for Anxiety.       ARIPiprazole 15 MG tablet  Commonly known as: ABILIFY      GIVE ONE Tablet BY MOUTH DAILY       atorvastatin 10 MG tablet  Commonly known as: LIPITOR      GIVE ONE Tablet BY MOUTH  DAILY       chlorproMAZINE 25 MG tablet  Commonly known as: THORAZINE      GIVE  ONE Tablet BY MOUTH TWICE A DAY       cholecalciferol 25 MCG (1000 UT) tablet  Commonly known as: VITAMIN D3      Take 1,000 Units by mouth Daily.       Vitamin D3 50 MCG (2000 UT) capsule      GIVE ONE Capsule BY MOUTH DAILY       citalopram 20 MG tablet  Commonly known as: CeleXA      GIVE ONE Tablet BY MOUTH DAILY       clindamycin-benzoyl peroxide 1-5 % gel  Commonly known as: BENZACLIN      APPLY TO THE AFFECTED AREA(S) TWICE DAILY       cloNIDine 0.1 MG tablet  Commonly known as: CATAPRES      GIVE 1 AND 1/2 Tablets BY MOUTH AT BEDTIME       dicyclomine 20 MG tablet  Commonly known as: BENTYL      GIVE 1 Tablet BY MOUTH FOUR TIMES DAILY       divalproex 500 MG DR tablet  Commonly known as: DEPAKOTE      GIVE 1 Tablet BY MOUTH THREE TIMES A DAY       Fiber Laxative 0.52 g capsule  Generic drug: psyllium      GIVE ONE Capsule BY MOUTH DAILY       melatonin 5 MG tablet tablet      GIVE 1 Tablet BY MOUTH AT BEDTIME       QUEtiapine 200 MG tablet  Commonly known as: SEROquel      GIVE 1 Tablet BY MOUTH AT BEDTIME       spironolactone 100 MG tablet  Commonly known as: ALDACTONE      GIVE ONE Tablet BY MOUTH DAILY       topiramate 50 MG tablet  Commonly known as: TOPAMAX      GIVE ONE Tablet BY MOUTH DAILY       traZODone 150 MG tablet  Commonly known as: DESYREL      GIVE 1/2 Tablet BY MOUTH AT BEDTIME          ASK your doctor about these medications      Instructions Last Dose Given Next Dose Due   mirtazapine 15 MG tablet  Commonly known as: REMERON      GIVE 1 Tablet BY MOUTH AT BEDTIME             Where to Get Your Medications      These medications were sent to [facility] - [address] [address]    Hours: 7AM-5PM Mon-Fri Phone: [phone] ·   amoxicillin-clavulanate 875-125 MG per tablet  · traMADol 50 MG tablet        Discharge Disposition: current living arrangements    Follow-up:  Follow-up in the office in about 6 weeks for repeat imaging.  Return if symptoms worsen.         This note has been electronically signed.    Brayden Carnes MD  October 5, 2022  15:53 CDT

## 2022-10-05 NOTE — PLAN OF CARE
Goal Outcome Evaluation:      Vss, voiding, tolerating po diet, discharge orders in, abx called in for STREP infection, 6wk follow up scheduled, ready for discharge.

## 2022-10-12 ENCOUNTER — TELEPHONE (OUTPATIENT)
Dept: OBSTETRICS AND GYNECOLOGY | Facility: CLINIC | Age: 27
End: 2022-10-12

## 2022-10-12 NOTE — TELEPHONE ENCOUNTER
----- Message from Brayden Carnes MD sent at 10/12/2022  9:39 AM CDT -----  It looks like she is scheduled for an ultrasound but not an office visit.  Please make sure that she has an office visit with me scheduled at the same time.    ----- Message -----  From: Zayda, Rad Results [place] In  Sent: 10/6/2022   9:35 AM CDT  To: Brayden Carnes MD

## 2023-05-15 ENCOUNTER — HOSPITAL ENCOUNTER (OUTPATIENT)
Dept: GENERAL RADIOLOGY | Facility: HOSPITAL | Age: 28
Discharge: HOME | End: 2023-05-15
Admitting: FAMILY MEDICINE
Payer: COMMERCIAL

## 2023-05-15 DIAGNOSIS — R13.10 DYSPHAGIA, UNSPECIFIED TYPE: ICD-10-CM

## 2023-05-15 PROCEDURE — 74230 X-RAY XM SWLNG FUNCJ C+: CPT

## 2023-05-15 PROCEDURE — 92611 MOTION FLUOROSCOPY/SWALLOW: CPT | Performed by: SPEECH-LANGUAGE PATHOLOGIST

## 2023-05-15 RX ADMIN — BARIUM SULFATE 35 ML: 960 POWDER, FOR SUSPENSION ORAL at 10:42

## 2023-05-15 NOTE — THERAPY EVALUATION
Speech Language Pathology   MBS FEES / Discharge Summary  [facility]       Patient Name: Krista Kauffman  : 1995  MRN: [id number]    Today's Date: 5/15/2023      Visit Date: 05/15/2023     Visit Dx:     ICD-10-CM ICD-9-CM   1. Dysphagia, unspecified type  R13.10 787.20       Patient Active Problem List   Diagnosis    Bipolar affective disorder, current episode mixed    Autism spectrum disorder    Hypercholesterolemia    Mixed anxiety and depressive disorder    Insomnia    Irritable bowel syndrome    Overweight    Autism    Disruptive mood dysregulation disorder    Well adult exam    Ovarian cyst    Streptococcal pharyngitis        Past Medical History:   Diagnosis Date    Autism     Bipolar 1 disorder     Borderline intellectual functioning     Depression     Irritable bowel syndrome     Moderate intellectual disabilities     OCD (obsessive compulsive disorder)         Past Surgical History:   Procedure Laterality Date    HYSTERECTOMY                    OP SLP Assessment/Plan - 05/15/23 1000          SLP Assessment    Functional Problems Swallowing  -EK    Impact on Function: Swallowing Risk of aspiration;Impact on social aspects of eating  -EK    Clinical Impression: Swallowing Mild:;oral phase dysphagia   due to cognition -EK    Functional Problems Comment MBS completed. Pt displayed premature spillage and delayed swallow to the pyriform sinus on thin liquids. Pt took small sips by straw as she prefers a straw. SLP cued pt to take larger sip of thin barium. No penetration or aspiration noted. Pt did adequately masticate the arthur cracker and displayed a piecemeal swallow but cleared bolus of oral and pharyngeal cavity. SLP then trialed the soft (peach). Pt did not masticate peach adequately but swallowed as SLP cued pt to chew more.  -EK    Clinical Impression Comments No penetration or aspiration however max cues needed to masticate foods adequately. Pt would benefit from small bolus and cut up  foods to help ensure safe bolus management. Concern for choking on foods thay may not be adequately chewed.  -EK    SLP Diagnosis oral dysphagia related to cogntive status and baseline deficits  -EK              User Key  (r) = Recorded By, (t) = Taken By, (c) = Cosigned By      Initials Name Provider Type    EK Lisa Venegas CCC-SLP Speech and Language Pathologist                     SLP Adult Swallow Evaluation       Row Name 05/15/23 1000       Rehab Evaluation    Document Type evaluation  -EK    Subjective Information no complaints  -EK    Patient Observations alert;cooperative;agree to therapy  -EK    Patient/Family/Caregiver Comments/Observations caregiver present  -EK    Patient Effort good  -EK       General Information    Patient Profile Reviewed yes  -EK    Pertinent History Of Current Problem SLP from [facility] contacted SLP to discuss tolerance of regular solids and regular liquids. Pt with recent weight loss and MBS needed to ensure safety of current diet. Pt needs cues from caergivers to chew and swallow.  -EK    Current Method of Nutrition regular textures;thin liquids  -EK    Precautions/Limitations, Vision WFL  -EK    Precautions/Limitations, Hearing WFL  -EK    Prior Level of Function-Communication cognitive-linguistic impairment  -EK       Oral Motor Structure and Function    Dentition Assessment natural, present and adequate  -EK    Secretion Management WNL/WFL  -EK       Oral Musculature and Cranial Nerve Assessment    Oral Motor, Comment Appears to be WFL  -EK       MBS/VFSS    Utensils Used straw  -EK    Consistencies Trialed regular textures;thin liquids;soft to chew textures  arthur cracker, peach, liquid barium,  -EK       MBS/VFSS Interpretation    Oral Prep Phase impaired oral phase of swallowing  -EK    Oral Transit Phase impaired  -EK       Oral Preparatory Phase    Oral Preparatory Phase inadequate manipulation  -EK    Inadequate Manipulation mechanical soft  -EK    Oral  Preparatory Phase, Comment pt did not adequately masticate peach before swallowing. SLP cued consistently to chew but pt swallowed with very little mastication.  -EK       Oral Transit Phase    Impaired Oral Transit Phase premature spillage of liquids into pharynx;piecemeal oral transit  -EK    Piecemeal Oral Transit all consistencies tested  -EK    Premature Spillage of Liquids into Pharynx thin liquids  all sips by straw  -EK    Oral Transit Phase, Comment Pt took very small sips (possibly due to taste of barium) however SLP cued for larger sip.  -EK       Initiation of Pharyngeal Swallow    Initiation of Pharyngeal Swallow bolus in pyriform sinuses  -EK    Pharyngeal Phase impaired pharyngeal phase of swallowing  -EK    Rosenbek's Scale 1--->level 1  -EK       Esophageal Phase    Esophageal Phase no impairments  -EK       SLP Communication to Radiology    Severity Level of Dysphagia mild dysphagia  -EK    Consistencies Aspirated/Penetrated --  no aspiration or penetration  -EK    Summary Statement MBS completed. Pt displayed premature spillage and delayed swallow to the pyriform sinus on thin liquids. Pt took small sips by straw as she prefers a straw. SLP cued pt to take larger sip of thin barium. No penetration or aspiration noted. Pt did adequately masticate the arthur cracker and displayed a piecemeal swallow but cleared bolus of oral and pharyngeal cavity. SLP then trialed the soft (peach). Pt did not masticate peach adequately but swallowed as SLP cued pt to chew more.  -EK    Summary Statement Continued No penetration or aspiration however max cues needed to masticate foods adequately. Pt would benefit from small bolus and cut up foods to help ensure safe bolus management. Concern for choking on foods thay may not be adequately chewed.  -EK       SLP Evaluation Clinical Impression    SLP Swallowing Diagnosis mild;oral dysphagia  -EK    Functional Impact risk of aspiration/pneumonia;risk of  malnutrition;risk of dehydration  -EK    Swallow Criteria for Skilled Therapeutic Interventions Met demonstrates skilled criteria  continue speech therapy at [facility]  -EK       SLP Treatment Clinical Impressions    Barriers to Overall Progress (SLP) Cognitive status;Baseline deficits  -EK    Care Plan Review evaluation/treatment results reviewed  -EK       Recommendations    Therapy Frequency (Swallow) evaluation only  -EK    SLP Diet Recommendation regular textures;thin liquids  foods cut into small pieces  -EK    Recommended Precautions and Strategies upright posture during/after eating;small bites of food and sips of liquid;multiple swallows per bite of food;multiple swallows per sip of liquid;alternate between small bites of food and sips of liquid  cut up foods into small pieces  -EK    Oral Care Recommendations Oral Care BID/PRN  -EK    SLP Rec. for Method of Medication Administration as tolerated  -EK              User Key  (r) = Recorded By, (t) = Taken By, (c) = Cosigned By      Initials Name Provider Type    Lisa Ford CCC-SLP Speech and Language Pathologist                                    OP SLP Education       Row Name 05/15/23 1500       Education    Assessed Learning motivation  -EK    Learning Motivation Strong  -EK    Learning Method Demonstration;Explanation  -EK    Teaching Response Verbalized understanding;Demonstrated understanding  -EK    Education Comments SLP had to cue ptatient throughout study to chew and swallow.  -EK              User Key  (r) = Recorded By, (t) = Taken By, (c) = Cosigned By      Initials Name Effective Dates    Lisa Ford CCC-SLP 06/16/21 -                                        Time Calculation:        Therapy Charges for Today       Code Description Service Date Service Provider Modifiers Qty    [id number] HC ST MOTION FLUORO EVAL SWALLOW 3 5/15/2023 Lisa Venegas CCC-SLP GN 1                    Lisa  Robby Venegas, AYO-SLP  5/15/2023

## 2023-08-18 NOTE — TELEPHONE ENCOUNTER
----- Message from HALI Urena sent at 1/18/2021 10:15 AM CST -----  tsh wnl, depakote levels wnl, cmp ok, cholesterol controlled, a1c ok, cbc ok.  
Attempted to contact patient, number not working.  Letter sent  
Detail Level: Detailed